# Patient Record
Sex: MALE | Race: WHITE | NOT HISPANIC OR LATINO | URBAN - METROPOLITAN AREA
[De-identification: names, ages, dates, MRNs, and addresses within clinical notes are randomized per-mention and may not be internally consistent; named-entity substitution may affect disease eponyms.]

---

## 2017-01-04 ENCOUNTER — OUTPATIENT (OUTPATIENT)
Dept: OUTPATIENT SERVICES | Facility: HOSPITAL | Age: 72
LOS: 1 days | Discharge: HOME | End: 2017-01-04

## 2017-01-04 ENCOUNTER — APPOINTMENT (OUTPATIENT)
Dept: INFUSION THERAPY | Facility: CLINIC | Age: 72
End: 2017-01-04

## 2017-01-04 LAB
BASOPHILS # BLD: 0.03 TH/MM3
BASOPHILS NFR BLD: 0.3 %
EOSINOPHIL # BLD: 0.41 TH/MM3
EOSINOPHIL NFR BLD: 3.7 %
ERYTHROCYTE [DISTWIDTH] IN BLOOD BY AUTOMATED COUNT: 12.6 %
GRANULOCYTES # BLD: 7.7 TH/MM3
GRANULOCYTES NFR BLD: 69.5 %
HCT VFR BLD AUTO: 41.4 %
HGB BLD-MCNC: 14.5 G/DL
IMM GRANULOCYTES # BLD: 0.08 TH/MM3
IMM GRANULOCYTES NFR BLD: 0.7 %
LYMPHOCYTES # BLD: 1.74 TH/MM3
LYMPHOCYTES NFR BLD: 15.7 %
MCH RBC QN AUTO: 31 PG
MCHC RBC AUTO-ENTMCNC: 35 G/DL
MCV RBC AUTO: 88.5 FL
MONOCYTES # BLD: 1.12 TH/MM3
MONOCYTES NFR BLD: 10.1 %
PLATELET # BLD: 197 TH/MM3
PMV BLD AUTO: 10.5 FL
RBC # BLD AUTO: 4.68 MIL/MM3
WBC # BLD: 11.08 TH/MM3

## 2017-02-14 ENCOUNTER — APPOINTMENT (OUTPATIENT)
Dept: HEMATOLOGY ONCOLOGY | Facility: CLINIC | Age: 72
End: 2017-02-14

## 2017-02-14 ENCOUNTER — RESULT REVIEW (OUTPATIENT)
Age: 72
End: 2017-02-14

## 2017-02-15 LAB
ALBUMIN SERPL-MCNC: 4.1 G/DL
ALBUMIN/GLOB SERPL: 1.32
ALP SERPL-CCNC: 98 IU/L
ALT SERPL-CCNC: 27 IU/L
ANION GAP SERPL CALC-SCNC: 11 MEQ/L
AST SERPL-CCNC: 28 IU/L
BILIRUB SERPL-MCNC: 1.1 MG/DL
BUN SERPL-MCNC: 18 MG/DL
BUN/CREAT SERPL: 18.8 %
CALCIUM SERPL-MCNC: 9.4 MG/DL
CHLORIDE SERPL-SCNC: 102 MEQ/L
CO2 SERPL-SCNC: 26 MEQ/L
CREAT SERPL-MCNC: 0.96 MG/DL
GFR SERPL CREATININE-BSD FRML MDRD: 77
GLUCOSE SERPL-MCNC: 112 MG/DL
HBV CORE AB SER-ACNC: NONREACTIVE
HBV SURFACE AB SER-ACNC: NONREACTIVE
HBV SURFACE AG SER-ACNC: NONREACTIVE
HCV AB S/CO SERPL IA: 0.17 S/CO
HCV AB SER QL: NONREACTIVE
IGG FLD-MCNC: 1500 MG/DL
POTASSIUM SERPL-SCNC: 4.7 MMOL/L
PROT SERPL-MCNC: 7.2 G/DL
SODIUM SERPL-SCNC: 139 MEQ/L

## 2017-03-06 ENCOUNTER — RESULT REVIEW (OUTPATIENT)
Age: 72
End: 2017-03-06

## 2017-03-06 ENCOUNTER — APPOINTMENT (OUTPATIENT)
Dept: INFUSION THERAPY | Facility: CLINIC | Age: 72
End: 2017-03-06

## 2017-03-06 LAB
BASOPHILS # BLD: 0.04 TH/MM3
BASOPHILS NFR BLD: 0.4 %
EOSINOPHIL # BLD: 0.48 TH/MM3
EOSINOPHIL NFR BLD: 5.4 %
ERYTHROCYTE [DISTWIDTH] IN BLOOD BY AUTOMATED COUNT: 12.5 %
GRANULOCYTES # BLD: 5.67 TH/MM3
GRANULOCYTES NFR BLD: 63.1 %
HCT VFR BLD AUTO: 41.6 %
HGB BLD-MCNC: 14.8 G/DL
IMM GRANULOCYTES # BLD: 0.05 TH/MM3
IMM GRANULOCYTES NFR BLD: 0.6 %
LYMPHOCYTES # BLD: 1.64 TH/MM3
LYMPHOCYTES NFR BLD: 18.3 %
MCH RBC QN AUTO: 31 PG
MCHC RBC AUTO-ENTMCNC: 35.6 G/DL
MCV RBC AUTO: 87.2 FL
MONOCYTES # BLD: 1.09 TH/MM3
MONOCYTES NFR BLD: 12.2 %
PLATELET # BLD: 175 TH/MM3
PMV BLD AUTO: 10.5 FL
RBC # BLD AUTO: 4.77 MIL/MM3
WBC # BLD: 8.97 TH/MM3

## 2017-03-07 ENCOUNTER — APPOINTMENT (OUTPATIENT)
Dept: INFUSION THERAPY | Facility: CLINIC | Age: 72
End: 2017-03-07

## 2017-03-07 LAB
ALBUMIN SERPL-MCNC: 3.9 G/DL
ALBUMIN/GLOB SERPL: 1.18
ALP SERPL-CCNC: 88 IU/L
ALT SERPL-CCNC: 23 IU/L
ANION GAP SERPL CALC-SCNC: 11 MEQ/L
AST SERPL-CCNC: 23 IU/L
BILIRUB SERPL-MCNC: 0.9 MG/DL
BUN SERPL-MCNC: 22 MG/DL
BUN/CREAT SERPL: 22.2 %
CALCIUM SERPL-MCNC: 9.4 MG/DL
CHLORIDE SERPL-SCNC: 102 MEQ/L
CO2 SERPL-SCNC: 23 MEQ/L
CREAT SERPL-MCNC: 0.99 MG/DL
GFR SERPL CREATININE-BSD FRML MDRD: 74
GLUCOSE SERPL-MCNC: 106 MG/DL
POTASSIUM SERPL-SCNC: 4.5 MMOL/L
PROT SERPL-MCNC: 7.2 G/DL
SODIUM SERPL-SCNC: 136 MEQ/L

## 2017-03-15 ENCOUNTER — APPOINTMENT (OUTPATIENT)
Dept: INFUSION THERAPY | Facility: CLINIC | Age: 72
End: 2017-03-15

## 2017-03-15 LAB
BASOPHILS # BLD: 0.15 TH/MM3
BASOPHILS NFR BLD: 0.6 %
EOSINOPHIL # BLD: 0.42 TH/MM3
EOSINOPHIL NFR BLD: 1.7 %
ERYTHROCYTE [DISTWIDTH] IN BLOOD BY AUTOMATED COUNT: 12.9 %
GRANULOCYTES # BLD: 20.58 TH/MM3
GRANULOCYTES NFR BLD: 81.1 %
HCT VFR BLD AUTO: 41.5 %
HGB BLD-MCNC: 14.8 G/DL
IMM GRANULOCYTES # BLD: 1.1 TH/MM3
IMM GRANULOCYTES NFR BLD: 4.3 %
LYMPHOCYTES # BLD: 0.57 TH/MM3
LYMPHOCYTES NFR BLD: 2.2 %
MCH RBC QN AUTO: 31.4 PG
MCHC RBC AUTO-ENTMCNC: 35.7 G/DL
MCV RBC AUTO: 88.1 FL
MONOCYTES # BLD: 2.55 TH/MM3
MONOCYTES NFR BLD: 10.1 %
PLATELET # BLD: 147 TH/MM3
PMV BLD AUTO: 10 FL
RBC # BLD AUTO: 4.71 MIL/MM3
WBC # BLD: 25.37 TH/MM3

## 2017-03-20 ENCOUNTER — APPOINTMENT (OUTPATIENT)
Dept: INFUSION THERAPY | Facility: CLINIC | Age: 72
End: 2017-03-20

## 2017-03-20 LAB
BASOPHILS # BLD: 0.22 TH/MM3
BASOPHILS NFR BLD: 1.3 %
EOSINOPHIL # BLD: 1.07 TH/MM3
EOSINOPHIL NFR BLD: 6.4 %
ERYTHROCYTE [DISTWIDTH] IN BLOOD BY AUTOMATED COUNT: 13.2 %
GRANULOCYTES # BLD: 13.03 TH/MM3
GRANULOCYTES NFR BLD: 78.1 %
HCT VFR BLD AUTO: 42.6 %
HGB BLD-MCNC: 15.1 G/DL
IMM GRANULOCYTES # BLD: 0.55 TH/MM3
IMM GRANULOCYTES NFR BLD: 3.3 %
LYMPHOCYTES # BLD: 0.89 TH/MM3
LYMPHOCYTES NFR BLD: 5.3 %
MCH RBC QN AUTO: 31.5 PG
MCHC RBC AUTO-ENTMCNC: 35.4 G/DL
MCV RBC AUTO: 88.9 FL
MONOCYTES # BLD: 0.93 TH/MM3
MONOCYTES NFR BLD: 5.6 %
PLATELET # BLD: 162 TH/MM3
PMV BLD AUTO: 10.6 FL
RBC # BLD AUTO: 4.79 MIL/MM3
WBC # BLD: 16.69 TH/MM3

## 2017-03-30 ENCOUNTER — APPOINTMENT (OUTPATIENT)
Dept: HEMATOLOGY ONCOLOGY | Facility: CLINIC | Age: 72
End: 2017-03-30

## 2017-03-30 VITALS
DIASTOLIC BLOOD PRESSURE: 63 MMHG | HEIGHT: 68 IN | WEIGHT: 200 LBS | RESPIRATION RATE: 12 BRPM | HEART RATE: 83 BPM | SYSTOLIC BLOOD PRESSURE: 122 MMHG | TEMPERATURE: 98.2 F | BODY MASS INDEX: 30.31 KG/M2

## 2017-03-30 LAB
BASOPHILS # BLD: 0.19 TH/MM3
BASOPHILS NFR BLD: 2.3 %
EOSINOPHIL # BLD: 1.05 TH/MM3
EOSINOPHIL NFR BLD: 12.7 %
ERYTHROCYTE [DISTWIDTH] IN BLOOD BY AUTOMATED COUNT: 12.7 %
GRANULOCYTES # BLD: 4.52 TH/MM3
GRANULOCYTES NFR BLD: 54.8 %
HCT VFR BLD AUTO: 38.3 %
HGB BLD-MCNC: 13.1 G/DL
IMM GRANULOCYTES # BLD: 0.07 TH/MM3
IMM GRANULOCYTES NFR BLD: 0.8 %
LYMPHOCYTES # BLD: 0.79 TH/MM3
LYMPHOCYTES NFR BLD: 9.6 %
MCH RBC QN AUTO: 30.5 PG
MCHC RBC AUTO-ENTMCNC: 34.2 G/DL
MCV RBC AUTO: 89.3 FL
MONOCYTES # BLD: 1.63 TH/MM3
MONOCYTES NFR BLD: 19.8 %
PLATELET # BLD: 215 TH/MM3
PMV BLD AUTO: 9.4 FL
RBC # BLD AUTO: 4.29 MIL/MM3
WBC # BLD: 8.25 TH/MM3

## 2017-03-31 LAB
ALBUMIN SERPL-MCNC: 3.5 G/DL
ALBUMIN/GLOB SERPL: 1.09
ALP SERPL-CCNC: 129 IU/L
ALT SERPL-CCNC: 33 IU/L
ANION GAP SERPL CALC-SCNC: 10 MEQ/L
AST SERPL-CCNC: 19 IU/L
BILIRUB SERPL-MCNC: 0.7 MG/DL
BUN SERPL-MCNC: 18 MG/DL
BUN/CREAT SERPL: 15.3 %
CALCIUM SERPL-MCNC: 9.5 MG/DL
CHLORIDE SERPL-SCNC: 104 MEQ/L
CO2 SERPL-SCNC: 28 MEQ/L
CREAT SERPL-MCNC: 1.18 MG/DL
GFR SERPL CREATININE-BSD FRML MDRD: 61
GLUCOSE SERPL-MCNC: 152 MG/DL
POTASSIUM SERPL-SCNC: 4.3 MMOL/L
PROT SERPL-MCNC: 6.7 G/DL
SODIUM SERPL-SCNC: 142 MEQ/L

## 2017-04-04 ENCOUNTER — APPOINTMENT (OUTPATIENT)
Dept: INFUSION THERAPY | Facility: CLINIC | Age: 72
End: 2017-04-04

## 2017-04-05 ENCOUNTER — APPOINTMENT (OUTPATIENT)
Dept: INFUSION THERAPY | Facility: CLINIC | Age: 72
End: 2017-04-05

## 2017-04-11 ENCOUNTER — APPOINTMENT (OUTPATIENT)
Dept: INFUSION THERAPY | Facility: CLINIC | Age: 72
End: 2017-04-11

## 2017-04-18 ENCOUNTER — APPOINTMENT (OUTPATIENT)
Dept: INFUSION THERAPY | Facility: CLINIC | Age: 72
End: 2017-04-18

## 2017-04-27 ENCOUNTER — APPOINTMENT (OUTPATIENT)
Dept: HEMATOLOGY ONCOLOGY | Facility: CLINIC | Age: 72
End: 2017-04-27

## 2017-04-27 VITALS
TEMPERATURE: 97.6 F | WEIGHT: 200 LBS | HEIGHT: 68 IN | HEART RATE: 83 BPM | DIASTOLIC BLOOD PRESSURE: 62 MMHG | RESPIRATION RATE: 14 BRPM | BODY MASS INDEX: 30.31 KG/M2 | SYSTOLIC BLOOD PRESSURE: 111 MMHG

## 2017-04-27 LAB
BASOPHILS # BLD: 0.19 TH/MM3
BASOPHILS NFR BLD: 1.6 %
EOSINOPHIL # BLD: 2.1 TH/MM3
EOSINOPHIL NFR BLD: 17.6 %
ERYTHROCYTE [DISTWIDTH] IN BLOOD BY AUTOMATED COUNT: 13.7 %
GRANULOCYTES # BLD: 5.07 TH/MM3
GRANULOCYTES NFR BLD: 42.4 %
HCT VFR BLD AUTO: 36.1 %
HGB BLD-MCNC: 12.3 G/DL
IMM GRANULOCYTES # BLD: 0.16 TH/MM3
IMM GRANULOCYTES NFR BLD: 1.3 %
LYMPHOCYTES # BLD: 2.2 TH/MM3
LYMPHOCYTES NFR BLD: 18.4 %
MCH RBC QN AUTO: 31.1 PG
MCHC RBC AUTO-ENTMCNC: 34.1 G/DL
MCV RBC AUTO: 91.2 FL
MONOCYTES # BLD: 2.24 TH/MM3
MONOCYTES NFR BLD: 18.7 %
PLATELET # BLD: 243 TH/MM3
PMV BLD AUTO: 9.9 FL
RBC # BLD AUTO: 3.96 MIL/MM3
WBC # BLD: 11.96 TH/MM3

## 2017-04-28 LAB
ALBUMIN SERPL-MCNC: 3.4 G/DL
ALBUMIN/GLOB SERPL: 1.31
ALP SERPL-CCNC: 135 IU/L
ALT SERPL-CCNC: 35 IU/L
ANION GAP SERPL CALC-SCNC: 11 MEQ/L
AST SERPL-CCNC: 29 IU/L
BILIRUB SERPL-MCNC: 0.8 MG/DL
BUN SERPL-MCNC: 13 MG/DL
BUN/CREAT SERPL: 13 %
CALCIUM SERPL-MCNC: 9.4 MG/DL
CHLORIDE SERPL-SCNC: 106 MEQ/L
CO2 SERPL-SCNC: 25 MEQ/L
CREAT SERPL-MCNC: 1 MG/DL
GFR SERPL CREATININE-BSD FRML MDRD: 73
GLUCOSE SERPL-MCNC: 111 MG/DL
POTASSIUM SERPL-SCNC: 4.9 MMOL/L
PROT SERPL-MCNC: 6 G/DL
SODIUM SERPL-SCNC: 142 MEQ/L

## 2017-05-02 ENCOUNTER — APPOINTMENT (OUTPATIENT)
Dept: INFUSION THERAPY | Facility: CLINIC | Age: 72
End: 2017-05-02

## 2017-05-03 ENCOUNTER — APPOINTMENT (OUTPATIENT)
Dept: INFUSION THERAPY | Facility: CLINIC | Age: 72
End: 2017-05-03

## 2017-05-22 ENCOUNTER — APPOINTMENT (OUTPATIENT)
Dept: HEMATOLOGY ONCOLOGY | Facility: CLINIC | Age: 72
End: 2017-05-22

## 2017-05-22 VITALS
DIASTOLIC BLOOD PRESSURE: 66 MMHG | HEIGHT: 68 IN | BODY MASS INDEX: 30.16 KG/M2 | RESPIRATION RATE: 12 BRPM | WEIGHT: 199 LBS | HEART RATE: 94 BPM | TEMPERATURE: 97.3 F | SYSTOLIC BLOOD PRESSURE: 121 MMHG

## 2017-05-22 LAB
BASOPHILS # BLD: 0.06 TH/MM3
BASOPHILS NFR BLD: 0.6 %
EOSINOPHIL # BLD: 1.47 TH/MM3
EOSINOPHIL NFR BLD: 14.6 %
ERYTHROCYTE [DISTWIDTH] IN BLOOD BY AUTOMATED COUNT: 14.1 %
GRANULOCYTES # BLD: 4.44 TH/MM3
GRANULOCYTES NFR BLD: 44.3 %
HCT VFR BLD AUTO: 34.1 %
HGB BLD-MCNC: 11.8 G/DL
IMM GRANULOCYTES # BLD: 0.06 TH/MM3
IMM GRANULOCYTES NFR BLD: 0.6 %
LYMPHOCYTES # BLD: 1.86 TH/MM3
LYMPHOCYTES NFR BLD: 18.5 %
MCH RBC QN AUTO: 31.6 PG
MCHC RBC AUTO-ENTMCNC: 34.6 G/DL
MCV RBC AUTO: 91.4 FL
MONOCYTES # BLD: 2.15 TH/MM3
MONOCYTES NFR BLD: 21.4 %
PLATELET # BLD: 188 TH/MM3
PMV BLD AUTO: 10.3 FL
RBC # BLD AUTO: 3.73 MIL/MM3
WBC # BLD: 10.04 TH/MM3

## 2017-05-30 ENCOUNTER — APPOINTMENT (OUTPATIENT)
Dept: INFUSION THERAPY | Facility: CLINIC | Age: 72
End: 2017-05-30

## 2017-05-30 LAB
BASOPHILS # BLD: 0.05 TH/MM3
BASOPHILS NFR BLD: 0.6 %
EOSINOPHIL # BLD: 1.01 TH/MM3
EOSINOPHIL NFR BLD: 12.8 %
ERYTHROCYTE [DISTWIDTH] IN BLOOD BY AUTOMATED COUNT: 13.9 %
GRANULOCYTES # BLD: 3.58 TH/MM3
GRANULOCYTES NFR BLD: 45.3 %
HCT VFR BLD AUTO: 32 %
HGB BLD-MCNC: 11.1 G/DL
IMM GRANULOCYTES # BLD: 0.06 TH/MM3
IMM GRANULOCYTES NFR BLD: 0.8 %
LYMPHOCYTES # BLD: 1.61 TH/MM3
LYMPHOCYTES NFR BLD: 20.4 %
MCH RBC QN AUTO: 31.6 PG
MCHC RBC AUTO-ENTMCNC: 34.7 G/DL
MCV RBC AUTO: 91.2 FL
MONOCYTES # BLD: 1.59 TH/MM3
MONOCYTES NFR BLD: 20.1 %
PLATELET # BLD: 174 TH/MM3
PMV BLD AUTO: 10.1 FL
RBC # BLD AUTO: 3.51 MIL/MM3
WBC # BLD: 7.9 TH/MM3

## 2017-05-31 ENCOUNTER — APPOINTMENT (OUTPATIENT)
Dept: INFUSION THERAPY | Facility: CLINIC | Age: 72
End: 2017-05-31

## 2017-06-19 ENCOUNTER — APPOINTMENT (OUTPATIENT)
Dept: HEMATOLOGY ONCOLOGY | Facility: CLINIC | Age: 72
End: 2017-06-19

## 2017-06-19 VITALS
SYSTOLIC BLOOD PRESSURE: 125 MMHG | BODY MASS INDEX: 27.28 KG/M2 | RESPIRATION RATE: 12 BRPM | TEMPERATURE: 97.3 F | HEART RATE: 77 BPM | DIASTOLIC BLOOD PRESSURE: 62 MMHG | WEIGHT: 180 LBS | HEIGHT: 68 IN

## 2017-06-19 LAB
BASOPHILS # BLD: 0.05 TH/MM3
BASOPHILS NFR BLD: 0.7 %
EOSINOPHIL # BLD: 0.7 TH/MM3
EOSINOPHIL NFR BLD: 9.7 %
ERYTHROCYTE [DISTWIDTH] IN BLOOD BY AUTOMATED COUNT: 13.7 %
GRANULOCYTES # BLD: 3.76 TH/MM3
GRANULOCYTES NFR BLD: 52.1 %
HCT VFR BLD AUTO: 31.7 %
HGB BLD-MCNC: 10.9 G/DL
IMM GRANULOCYTES # BLD: 0.03 TH/MM3
IMM GRANULOCYTES NFR BLD: 0.4 %
LYMPHOCYTES # BLD: 1.19 TH/MM3
LYMPHOCYTES NFR BLD: 16.5 %
MCH RBC QN AUTO: 31.8 PG
MCHC RBC AUTO-ENTMCNC: 34.4 G/DL
MCV RBC AUTO: 92.4 FL
MONOCYTES # BLD: 1.49 TH/MM3
MONOCYTES NFR BLD: 20.6 %
PLATELET # BLD: 185 TH/MM3
PMV BLD AUTO: 10.1 FL
RBC # BLD AUTO: 3.43 MIL/MM3
WBC # BLD: 7.22 TH/MM3

## 2017-06-20 LAB
ALBUMIN SERPL-MCNC: 4 G/DL
ALBUMIN/GLOB SERPL: 2
ALP SERPL-CCNC: 148 IU/L
ALT SERPL-CCNC: 25 IU/L
ANION GAP SERPL CALC-SCNC: 9 MEQ/L
AST SERPL-CCNC: 27 IU/L
BILIRUB SERPL-MCNC: 0.8 MG/DL
BUN SERPL-MCNC: 18 MG/DL
BUN/CREAT SERPL: 17.5 %
CALCIUM SERPL-MCNC: 9.3 MG/DL
CHLORIDE SERPL-SCNC: 108 MEQ/L
CO2 SERPL-SCNC: 22 MEQ/L
CREAT SERPL-MCNC: 1.03 MG/DL
GFR SERPL CREATININE-BSD FRML MDRD: 71
GLUCOSE SERPL-MCNC: 100 MG/DL
POTASSIUM SERPL-SCNC: 4.5 MMOL/L
PROT SERPL-MCNC: 6 G/DL
SODIUM SERPL-SCNC: 139 MEQ/L

## 2017-06-27 ENCOUNTER — APPOINTMENT (OUTPATIENT)
Dept: INFUSION THERAPY | Facility: CLINIC | Age: 72
End: 2017-06-27

## 2017-06-27 LAB
BASOPHILS # BLD: 0.04 TH/MM3
BASOPHILS NFR BLD: 0.6 %
EOSINOPHIL # BLD: 0.57 TH/MM3
EOSINOPHIL NFR BLD: 9.1 %
ERYTHROCYTE [DISTWIDTH] IN BLOOD BY AUTOMATED COUNT: 13.7 %
GRANULOCYTES # BLD: 2.78 TH/MM3
GRANULOCYTES NFR BLD: 44.4 %
HCT VFR BLD AUTO: 29.6 %
HGB BLD-MCNC: 10 G/DL
IMM GRANULOCYTES # BLD: 0.04 TH/MM3
IMM GRANULOCYTES NFR BLD: 0.6 %
LYMPHOCYTES # BLD: 1.16 TH/MM3
LYMPHOCYTES NFR BLD: 18.5 %
MCH RBC QN AUTO: 31.5 PG
MCHC RBC AUTO-ENTMCNC: 33.8 G/DL
MCV RBC AUTO: 93.4 FL
MONOCYTES # BLD: 1.68 TH/MM3
MONOCYTES NFR BLD: 26.8 %
PLATELET # BLD: 158 TH/MM3
PMV BLD AUTO: 9.9 FL
RBC # BLD AUTO: 3.17 MIL/MM3
WBC # BLD: 6.27 TH/MM3

## 2017-06-28 ENCOUNTER — APPOINTMENT (OUTPATIENT)
Dept: INFUSION THERAPY | Facility: CLINIC | Age: 72
End: 2017-06-28

## 2017-07-17 ENCOUNTER — APPOINTMENT (OUTPATIENT)
Dept: HEMATOLOGY ONCOLOGY | Facility: CLINIC | Age: 72
End: 2017-07-17

## 2017-07-17 VITALS
SYSTOLIC BLOOD PRESSURE: 123 MMHG | TEMPERATURE: 97.4 F | BODY MASS INDEX: 27.89 KG/M2 | HEIGHT: 68 IN | HEART RATE: 71 BPM | RESPIRATION RATE: 12 BRPM | DIASTOLIC BLOOD PRESSURE: 64 MMHG | WEIGHT: 184 LBS

## 2017-07-17 DIAGNOSIS — Z00.00 ENCOUNTER FOR GENERAL ADULT MEDICAL EXAMINATION W/OUT ABNORMAL FINDINGS: ICD-10-CM

## 2017-07-17 LAB
BASOPHILS # BLD: 0.03 TH/MM3
BASOPHILS NFR BLD: 0.5 %
EOSINOPHIL # BLD: 0.51 TH/MM3
EOSINOPHIL NFR BLD: 7.8 %
ERYTHROCYTE [DISTWIDTH] IN BLOOD BY AUTOMATED COUNT: 13 %
GRANULOCYTES # BLD: 3.54 TH/MM3
GRANULOCYTES NFR BLD: 54 %
HCT VFR BLD AUTO: 33.1 %
HGB BLD-MCNC: 11.3 G/DL
IMM GRANULOCYTES # BLD: 0.04 TH/MM3
IMM GRANULOCYTES NFR BLD: 0.6 %
LYMPHOCYTES # BLD: 0.99 TH/MM3
LYMPHOCYTES NFR BLD: 15.1 %
MCH RBC QN AUTO: 31.7 PG
MCHC RBC AUTO-ENTMCNC: 34.1 G/DL
MCV RBC AUTO: 93 FL
MONOCYTES # BLD: 1.44 TH/MM3
MONOCYTES NFR BLD: 22 %
PLATELET # BLD: 156 TH/MM3
PMV BLD AUTO: 9.6 FL
RBC # BLD AUTO: 3.56 MIL/MM3
WBC # BLD: 6.55 TH/MM3

## 2017-07-18 LAB
ALBUMIN SERPL-MCNC: 3.8 G/DL
ALBUMIN/GLOB SERPL: 2
ALP SERPL-CCNC: 136 IU/L
ALT SERPL-CCNC: 17 IU/L
ANION GAP SERPL CALC-SCNC: 6 MEQ/L
AST SERPL-CCNC: 29 IU/L
BILIRUB SERPL-MCNC: 0.8 MG/DL
BUN SERPL-MCNC: 14 MG/DL
BUN/CREAT SERPL: 13.5 %
CALCIUM SERPL-MCNC: 9.6 MG/DL
CHLORIDE SERPL-SCNC: 106 MEQ/L
CO2 SERPL-SCNC: 27 MEQ/L
CREAT SERPL-MCNC: 1.04 MG/DL
GFR SERPL CREATININE-BSD FRML MDRD: 70
GLUCOSE SERPL-MCNC: 97 MG/DL
POTASSIUM SERPL-SCNC: 5.2 MMOL/L
PROT SERPL-MCNC: 5.7 G/DL
SODIUM SERPL-SCNC: 139 MEQ/L

## 2017-07-25 ENCOUNTER — APPOINTMENT (OUTPATIENT)
Dept: INFUSION THERAPY | Facility: CLINIC | Age: 72
End: 2017-07-25

## 2017-07-26 ENCOUNTER — RX RENEWAL (OUTPATIENT)
Age: 72
End: 2017-07-26

## 2017-07-26 ENCOUNTER — OUTPATIENT (OUTPATIENT)
Dept: OUTPATIENT SERVICES | Facility: HOSPITAL | Age: 72
LOS: 1 days | Discharge: HOME | End: 2017-07-26

## 2017-07-26 ENCOUNTER — APPOINTMENT (OUTPATIENT)
Dept: INFUSION THERAPY | Facility: CLINIC | Age: 72
End: 2017-07-26

## 2017-07-26 DIAGNOSIS — R63.0 ANOREXIA: ICD-10-CM

## 2017-07-26 DIAGNOSIS — R63.4 ABNORMAL WEIGHT LOSS: ICD-10-CM

## 2017-07-26 DIAGNOSIS — C85.90 NON-HODGKIN LYMPHOMA, UNSPECIFIED, UNSPECIFIED SITE: ICD-10-CM

## 2017-07-26 DIAGNOSIS — Z51.11 ENCOUNTER FOR ANTINEOPLASTIC CHEMOTHERAPY: ICD-10-CM

## 2017-08-28 ENCOUNTER — APPOINTMENT (OUTPATIENT)
Dept: HEMATOLOGY ONCOLOGY | Facility: CLINIC | Age: 72
End: 2017-08-28

## 2017-08-28 VITALS
SYSTOLIC BLOOD PRESSURE: 109 MMHG | DIASTOLIC BLOOD PRESSURE: 63 MMHG | HEART RATE: 90 BPM | HEIGHT: 68 IN | TEMPERATURE: 98 F | BODY MASS INDEX: 27.74 KG/M2 | RESPIRATION RATE: 14 BRPM | WEIGHT: 183 LBS

## 2017-09-25 DIAGNOSIS — C85.90 NON-HODGKIN LYMPHOMA, UNSPECIFIED, UNSPECIFIED SITE: ICD-10-CM

## 2017-11-20 ENCOUNTER — APPOINTMENT (OUTPATIENT)
Dept: HEMATOLOGY ONCOLOGY | Facility: CLINIC | Age: 72
End: 2017-11-20

## 2017-11-20 ENCOUNTER — OUTPATIENT (OUTPATIENT)
Dept: OUTPATIENT SERVICES | Facility: HOSPITAL | Age: 72
LOS: 1 days | Discharge: HOME | End: 2017-11-20

## 2017-11-20 ENCOUNTER — RESULT REVIEW (OUTPATIENT)
Age: 72
End: 2017-11-20

## 2017-11-20 VITALS
SYSTOLIC BLOOD PRESSURE: 138 MMHG | TEMPERATURE: 96.9 F | HEART RATE: 74 BPM | BODY MASS INDEX: 28.28 KG/M2 | RESPIRATION RATE: 14 BRPM | DIASTOLIC BLOOD PRESSURE: 85 MMHG | WEIGHT: 186 LBS

## 2017-11-20 DIAGNOSIS — E78.2 MIXED HYPERLIPIDEMIA: ICD-10-CM

## 2017-11-20 DIAGNOSIS — C85.90 NON-HODGKIN LYMPHOMA, UNSPECIFIED, UNSPECIFIED SITE: ICD-10-CM

## 2017-11-20 DIAGNOSIS — D64.9 ANEMIA, UNSPECIFIED: ICD-10-CM

## 2017-11-20 DIAGNOSIS — I10 ESSENTIAL (PRIMARY) HYPERTENSION: ICD-10-CM

## 2017-11-20 LAB
BASOPHILS # BLD: 0.03 TH/MM3
BASOPHILS NFR BLD: 0.6 %
EOSINOPHIL # BLD: 0.43 TH/MM3
EOSINOPHIL NFR BLD: 8.1 %
ERYTHROCYTE [DISTWIDTH] IN BLOOD BY AUTOMATED COUNT: 12 %
GRANULOCYTES # BLD: 2.53 TH/MM3
GRANULOCYTES NFR BLD: 47.5 %
HCT VFR BLD AUTO: 37.7 %
HGB BLD-MCNC: 13.3 G/DL
IMM GRANULOCYTES # BLD: 0.01 TH/MM3
IMM GRANULOCYTES NFR BLD: 0.2 %
LYMPHOCYTES # BLD: 1.36 TH/MM3
LYMPHOCYTES NFR BLD: 25.6 %
MCH RBC QN AUTO: 31.4 PG
MCHC RBC AUTO-ENTMCNC: 35.3 G/DL
MCV RBC AUTO: 88.9 FL
MONOCYTES # BLD: 0.96 TH/MM3
MONOCYTES NFR BLD: 18 %
PLATELET # BLD: 152 TH/MM3
PMV BLD AUTO: 9.9 FL
RBC # BLD AUTO: 4.24 MIL/MM3
WBC # BLD: 5.32 TH/MM3

## 2017-11-21 LAB
ALBUMIN SERPL-MCNC: 4.5 G/DL
ALBUMIN/GLOB SERPL: 2.25
ALP SERPL-CCNC: 107 IU/L
ALT SERPL-CCNC: 19 IU/L
ANION GAP SERPL CALC-SCNC: 10 MEQ/L
APPEARANCE UR: CLEAR
AST SERPL-CCNC: 27 IU/L
BILIRUB SERPL-MCNC: 1.2 MG/DL
BILIRUB UR QL STRIP: NEGATIVE
BUN SERPL-MCNC: 26 MG/DL
BUN/CREAT SERPL: 21.3 %
CALCIUM SERPL-MCNC: 9.6 MG/DL
CHLORIDE SERPL-SCNC: 104 MEQ/L
CHOLEST SERPL-MCNC: 159 MG/DL
CO2 SERPL-SCNC: 25 MEQ/L
COLOR UR: YELLOW
CREAT SERPL-MCNC: 1.22 MG/DL
ESTIMATED AVERGAGE GLUCOSE (NORTH): 120 MG/DL
GFR SERPL CREATININE-BSD FRML MDRD: 58
GLUCOSE SERPL-MCNC: 96 MG/DL
GLUCOSE UR STRIP-MCNC: NEGATIVE MG/DL
HBA1C MFR BLD: 5.8 %
HDLC SERPL-MCNC: 40 MG/DL
HDLC SERPL: 3.98
HGB UR QL STRIP: NEGATIVE
KETONES UR STRIP-MCNC: NEGATIVE MG/DL
LACTATE DEHYDROGENASE (NORTH): 230 IU/L
LDLC SERPL DIRECT ASSAY-MCNC: 95 MG/DL
NITRITE UR QL STRIP: NEGATIVE
PH UR STRIP: 5.5
POTASSIUM SERPL-SCNC: 4.9 MMOL/L
PROT SERPL-MCNC: 6.5 G/DL
PROT UR STRIP-MCNC: NEGATIVE MG/DL
PSA FREE FLD-MCNC: 0.31 NG/ML
PSA FREE FLD-MCNC: 25.4
PSA FREE MFR FLD: 1.23 NG/ML
SODIUM SERPL-SCNC: 139 MEQ/L
SP GR UR STRIP: 1.02
T4 FREE SERPL-MCNC: 1.2 NG/DL
TRIGL SERPL-MCNC: 113 MG/DL
TSH SERPL DL<=0.005 MIU/L-ACNC: 0.98 UIU/ML
UROBILINOGEN UR STRIP-MCNC: 0.2 MG/DL
VLDLC SERPL-MCNC: 22 MG/DL
WBC URNS QL MICRO: NEGATIVE

## 2017-11-22 LAB — BACTERIA UR CULT: NORMAL

## 2017-11-30 DIAGNOSIS — Z02.9 ENCOUNTER FOR ADMINISTRATIVE EXAMINATIONS, UNSPECIFIED: ICD-10-CM

## 2018-01-25 ENCOUNTER — RX RENEWAL (OUTPATIENT)
Age: 73
End: 2018-01-25

## 2018-04-12 ENCOUNTER — OUTPATIENT (OUTPATIENT)
Dept: OUTPATIENT SERVICES | Facility: HOSPITAL | Age: 73
LOS: 1 days | Discharge: HOME | End: 2018-04-12

## 2018-04-12 ENCOUNTER — APPOINTMENT (OUTPATIENT)
Dept: HEMATOLOGY ONCOLOGY | Facility: CLINIC | Age: 73
End: 2018-04-12

## 2018-04-12 ENCOUNTER — LABORATORY RESULT (OUTPATIENT)
Age: 73
End: 2018-04-12

## 2018-04-12 VITALS
TEMPERATURE: 96.9 F | HEART RATE: 91 BPM | RESPIRATION RATE: 14 BRPM | BODY MASS INDEX: 31.07 KG/M2 | WEIGHT: 205 LBS | HEIGHT: 68 IN | DIASTOLIC BLOOD PRESSURE: 67 MMHG | SYSTOLIC BLOOD PRESSURE: 147 MMHG

## 2018-04-12 LAB
HCT VFR BLD CALC: 40.6 %
HGB BLD-MCNC: 13.8 G/DL
MCHC RBC-ENTMCNC: 31.3 PG
MCHC RBC-ENTMCNC: 34 G/DL
MCV RBC AUTO: 92.1 FL
PLATELET # BLD AUTO: 178 K/UL
PMV BLD: 9.7 FL
RBC # BLD: 4.41 M/UL
RBC # FLD: 12.7 %
WBC # FLD AUTO: 9.51 K/UL

## 2018-04-13 LAB
ALBUMIN SERPL ELPH-MCNC: 4.5 G/DL
ALP BLD-CCNC: 107 U/L
ALT SERPL-CCNC: 21 U/L
ANION GAP SERPL CALC-SCNC: 16 MMOL/L
AST SERPL-CCNC: 26 U/L
BILIRUB SERPL-MCNC: 0.5 MG/DL
BUN SERPL-MCNC: 22 MG/DL
CALCIUM SERPL-MCNC: 9.7 MG/DL
CHLORIDE SERPL-SCNC: 103 MMOL/L
CO2 SERPL-SCNC: 23 MMOL/L
CREAT SERPL-MCNC: 1.3 MG/DL
GLUCOSE SERPL-MCNC: 122 MG/DL
IGG SER QL IEP: 977 MG/DL
LDH SERPL-CCNC: 334 U/L
POTASSIUM SERPL-SCNC: 5 MMOL/L
PROT SERPL-MCNC: 7.2 G/DL
SODIUM SERPL-SCNC: 142 MMOL/L

## 2018-04-16 DIAGNOSIS — C85.90 NON-HODGKIN LYMPHOMA, UNSPECIFIED, UNSPECIFIED SITE: ICD-10-CM

## 2018-10-11 ENCOUNTER — OUTPATIENT (OUTPATIENT)
Dept: OUTPATIENT SERVICES | Facility: HOSPITAL | Age: 73
LOS: 1 days | Discharge: HOME | End: 2018-10-11

## 2018-10-11 ENCOUNTER — LABORATORY RESULT (OUTPATIENT)
Age: 73
End: 2018-10-11

## 2018-10-11 ENCOUNTER — APPOINTMENT (OUTPATIENT)
Dept: HEMATOLOGY ONCOLOGY | Facility: CLINIC | Age: 73
End: 2018-10-11

## 2018-10-11 VITALS
SYSTOLIC BLOOD PRESSURE: 142 MMHG | HEIGHT: 68 IN | RESPIRATION RATE: 14 BRPM | BODY MASS INDEX: 30.77 KG/M2 | TEMPERATURE: 97.3 F | HEART RATE: 62 BPM | DIASTOLIC BLOOD PRESSURE: 80 MMHG | WEIGHT: 203 LBS

## 2018-10-11 LAB
ALBUMIN SERPL ELPH-MCNC: 4.6 G/DL
ALP BLD-CCNC: 119 U/L
ALT SERPL-CCNC: 17 U/L
ANION GAP SERPL CALC-SCNC: 15 MMOL/L
AST SERPL-CCNC: 21 U/L
BILIRUB SERPL-MCNC: 0.6 MG/DL
BUN SERPL-MCNC: 14 MG/DL
CALCIUM SERPL-MCNC: 9.8 MG/DL
CHLORIDE SERPL-SCNC: 103 MMOL/L
CO2 SERPL-SCNC: 25 MMOL/L
CREAT SERPL-MCNC: 1.3 MG/DL
GLUCOSE SERPL-MCNC: 126 MG/DL
HCT VFR BLD CALC: 37.6 %
HGB BLD-MCNC: 13.1 G/DL
LDH SERPL-CCNC: 269 U/L
MCHC RBC-ENTMCNC: 32 PG
MCHC RBC-ENTMCNC: 34.8 G/DL
MCV RBC AUTO: 91.9 FL
PLATELET # BLD AUTO: 166 K/UL
PMV BLD: 10.8 FL
POTASSIUM SERPL-SCNC: 5 MMOL/L
PROT SERPL-MCNC: 6.7 G/DL
RBC # BLD: 4.09 M/UL
RBC # FLD: 12.5 %
SODIUM SERPL-SCNC: 143 MMOL/L
WBC # FLD AUTO: 5.96 K/UL

## 2018-10-15 DIAGNOSIS — C85.99 NON-HODGKIN LYMPHOMA, UNSPECIFIED, EXTRANODAL AND SOLID ORGAN SITES: ICD-10-CM

## 2019-04-22 ENCOUNTER — LABORATORY RESULT (OUTPATIENT)
Age: 74
End: 2019-04-22

## 2019-04-22 ENCOUNTER — OUTPATIENT (OUTPATIENT)
Dept: OUTPATIENT SERVICES | Facility: HOSPITAL | Age: 74
LOS: 1 days | Discharge: HOME | End: 2019-04-22

## 2019-04-22 ENCOUNTER — APPOINTMENT (OUTPATIENT)
Dept: HEMATOLOGY ONCOLOGY | Facility: CLINIC | Age: 74
End: 2019-04-22

## 2019-04-22 VITALS
RESPIRATION RATE: 14 BRPM | HEART RATE: 77 BPM | DIASTOLIC BLOOD PRESSURE: 85 MMHG | WEIGHT: 203 LBS | SYSTOLIC BLOOD PRESSURE: 166 MMHG | TEMPERATURE: 98.2 F | BODY MASS INDEX: 30.77 KG/M2 | HEIGHT: 68 IN

## 2019-04-22 LAB
HCT VFR BLD CALC: 38.4 %
HGB BLD-MCNC: 13.1 G/DL
MCHC RBC-ENTMCNC: 31.3 PG
MCHC RBC-ENTMCNC: 34.1 G/DL
MCV RBC AUTO: 91.9 FL
PLATELET # BLD AUTO: 163 K/UL
PMV BLD: 10.4 FL
RBC # BLD: 4.18 M/UL
RBC # FLD: 12.6 %
WBC # FLD AUTO: 6.41 K/UL

## 2019-04-23 DIAGNOSIS — C85.90 NON-HODGKIN LYMPHOMA, UNSPECIFIED, UNSPECIFIED SITE: ICD-10-CM

## 2019-04-23 LAB
ALBUMIN SERPL ELPH-MCNC: 4.4 G/DL
ALP BLD-CCNC: 117 U/L
ALT SERPL-CCNC: 39 U/L
ANION GAP SERPL CALC-SCNC: 13 MMOL/L
AST SERPL-CCNC: 41 U/L
BILIRUB SERPL-MCNC: 0.6 MG/DL
BUN SERPL-MCNC: 25 MG/DL
CALCIUM SERPL-MCNC: 9.6 MG/DL
CHLORIDE SERPL-SCNC: 105 MMOL/L
CO2 SERPL-SCNC: 23 MMOL/L
CREAT SERPL-MCNC: 1.2 MG/DL
GLUCOSE SERPL-MCNC: 132 MG/DL
LDH SERPL-CCNC: 216 U/L
POTASSIUM SERPL-SCNC: 5.7 MMOL/L
PROT SERPL-MCNC: 6.6 G/DL
SODIUM SERPL-SCNC: 141 MMOL/L

## 2019-04-23 NOTE — PHYSICAL EXAM
[Restricted in physically strenuous activity but ambulatory and able to carry out work of a light or sedentary nature] : Status 1- Restricted in physically strenuous activity but ambulatory and able to carry out work of a light or sedentary nature, e.g., light house work, office work [Normal] : normal appearance, no rash, nodules, vesicles, ulcers, erythema [de-identified] : Arthritic changes [de-identified] : Gait looks normal

## 2019-04-23 NOTE — HISTORY OF PRESENT ILLNESS
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 He had residual disease in his knees after last treated with obinutuzumab and bendamustine to which he had a very good partial remission.\par His major complaint right now is mostly left knee discomfort after much reduced physical activity compared to the past. He has to stop from walking frequently. The right knee doesn't seem to be causing too much problem for the time being.\par Otherwise, he is denying any fever, night sweats, anorexia, weight loss. No particular problems with urine or bowel movements. [de-identified] : Germinal center DLBCL vs follicular

## 2019-04-23 NOTE — ASSESSMENT
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 The behavior of the disease is more consistent with a low grade lymphoma. R/O relapse/progression.\par The situation was discussed with the patient and his wife. Given the new signs and symptoms, the patient should have a reevaluation of the disease status, preferably with an MRI of the left knee to have a better visualization of that area rather than getting a PET scan again.\par We will also draw a CBC, CMP, LDH.\par Further recommendations after the above test results are available.\par \par All questions answered.\par \par If all stable, the patient will be seen again in 4 - 6 months for follow up or sooner if the suspicion of progression is confirmed.

## 2019-10-21 ENCOUNTER — APPOINTMENT (OUTPATIENT)
Dept: HEMATOLOGY ONCOLOGY | Facility: CLINIC | Age: 74
End: 2019-10-21
Payer: MEDICARE

## 2019-10-21 ENCOUNTER — OUTPATIENT (OUTPATIENT)
Dept: OUTPATIENT SERVICES | Facility: HOSPITAL | Age: 74
LOS: 1 days | Discharge: HOME | End: 2019-10-21

## 2019-10-21 ENCOUNTER — LABORATORY RESULT (OUTPATIENT)
Age: 74
End: 2019-10-21

## 2019-10-21 VITALS
BODY MASS INDEX: 30.31 KG/M2 | RESPIRATION RATE: 14 BRPM | WEIGHT: 200 LBS | TEMPERATURE: 96.4 F | SYSTOLIC BLOOD PRESSURE: 146 MMHG | HEIGHT: 68 IN | DIASTOLIC BLOOD PRESSURE: 70 MMHG | HEART RATE: 70 BPM

## 2019-10-21 LAB
ALBUMIN SERPL ELPH-MCNC: 4.2 G/DL
ALP BLD-CCNC: 95 U/L
ALT SERPL-CCNC: 23 U/L
ANION GAP SERPL CALC-SCNC: 11 MMOL/L
AST SERPL-CCNC: 26 U/L
BILIRUB SERPL-MCNC: 0.4 MG/DL
BUN SERPL-MCNC: 25 MG/DL
CALCIUM SERPL-MCNC: 9.5 MG/DL
CHLORIDE SERPL-SCNC: 100 MMOL/L
CO2 SERPL-SCNC: 29 MMOL/L
CREAT SERPL-MCNC: 1.3 MG/DL
GLUCOSE SERPL-MCNC: 137 MG/DL
HCT VFR BLD CALC: 40.9 %
HGB BLD-MCNC: 13.8 G/DL
LDH SERPL-CCNC: 215 U/L
MCHC RBC-ENTMCNC: 31.4 PG
MCHC RBC-ENTMCNC: 33.7 G/DL
MCV RBC AUTO: 93 FL
PLATELET # BLD AUTO: 163 K/UL
PMV BLD: 9.9 FL
POTASSIUM SERPL-SCNC: 5.2 MMOL/L
PROT SERPL-MCNC: 6.8 G/DL
RBC # BLD: 4.4 M/UL
RBC # FLD: 12.8 %
SODIUM SERPL-SCNC: 140 MMOL/L
WBC # FLD AUTO: 9.02 K/UL

## 2019-10-21 PROCEDURE — 99214 OFFICE O/P EST MOD 30 MIN: CPT

## 2019-10-22 DIAGNOSIS — C85.90 NON-HODGKIN LYMPHOMA, UNSPECIFIED, UNSPECIFIED SITE: ICD-10-CM

## 2019-10-22 NOTE — REVIEW OF SYSTEMS
[Loss of Hearing] : loss of hearing [Joint Pain] : joint pain [Joint Stiffness] : joint stiffness [Negative] : Heme/Lymph

## 2019-11-01 ENCOUNTER — RX RENEWAL (OUTPATIENT)
Age: 74
End: 2019-11-01

## 2020-04-27 ENCOUNTER — APPOINTMENT (OUTPATIENT)
Dept: HEMATOLOGY ONCOLOGY | Facility: CLINIC | Age: 75
End: 2020-04-27

## 2020-05-28 ENCOUNTER — APPOINTMENT (OUTPATIENT)
Dept: HEMATOLOGY ONCOLOGY | Facility: CLINIC | Age: 75
End: 2020-05-28
Payer: MEDICARE

## 2020-05-28 PROCEDURE — 99213 OFFICE O/P EST LOW 20 MIN: CPT | Mod: 95

## 2020-05-28 NOTE — HISTORY OF PRESENT ILLNESS
[Medical Office: ([facility])___] : at the medical office located in  [Home] : at home, [unfilled] , at the time of the visit. [Disease:__________________________] : Disease: [unfilled] [Spouse] : spouse [Verbal consent obtained from patient] : the patient, [unfilled] [de-identified] : Telehealth consultation was performed calling the patient at his home upon his request. 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 He also had history of colon cancer years ago.\par The appetite is good. He has no fever or night sweats. No problems with urine or bowel movements. No new cough or shortness of breath. He has not felt any lumps or bumps in the neck, axillae or groins.\par He is getting ready to have back surgery again.\par The patient had several questions which were answered to his satisfaction.\par At this time he is due for reevaluation of his disease status with another PET scan in addition to blood work including a CBC, CMP, LDH, IgG, CEA. Will also add INR and PTT since he will undergo surgery.\par Further recommendations after the above test results are available. [de-identified] : Most likely follicular. [de-identified] : Low grade lymphoma

## 2020-05-28 NOTE — HISTORY OF PRESENT ILLNESS
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 His major problem seems to be his low back discomfort for which he is followed by his other physicians.\par His MRI about 6 months ago did not show any significant progression of the lymphoma in the knees.\par He is denying any fever or night sweats. He is walking with no major problems although getting tired sooner than in the past. The appetite is stable. No significant problems with urine or bowel movements.\par He seems to be up to date in terms of his screenings (especially colonoscopy). [de-identified] : Recurrent [de-identified] : Follicular, most likely, according to the pathologist.

## 2020-05-28 NOTE — PHYSICAL EXAM
[Restricted in physically strenuous activity but ambulatory and able to carry out work of a light or sedentary nature] : Status 1- Restricted in physically strenuous activity but ambulatory and able to carry out work of a light or sedentary nature, e.g., light house work, office work [Normal] : affect appropriate [de-identified] : Some arthritic changes

## 2020-05-28 NOTE — REVIEW OF SYSTEMS
[Fatigue] : fatigue [Loss of Hearing] : loss of hearing [Joint Pain] : joint pain [Joint Stiffness] : joint stiffness [Negative] : Endocrine

## 2020-05-28 NOTE — ASSESSMENT
[FreeTextEntry1] : 1. 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 R/O progressive disease.\par 2. Low back pain. Being addressed by his other physicians. Apparently secondary to arthritis and degererative changes.\par \par The situation was discussed with the patient and the wife.\par At this time, we will proceed with further blood work including a CBC, CMP, LDH.\par He is also due for another PET CT (rather than an MRI of the knees this time) for reevaluation of his disease status.\par All questions answered.\par Further recommendations after those results are available.\par If all stable, he will be seen again in 6 months for follow up.

## 2021-04-22 ENCOUNTER — LABORATORY RESULT (OUTPATIENT)
Age: 76
End: 2021-04-22

## 2021-04-22 ENCOUNTER — OUTPATIENT (OUTPATIENT)
Dept: OUTPATIENT SERVICES | Facility: HOSPITAL | Age: 76
LOS: 1 days | Discharge: HOME | End: 2021-04-22

## 2021-04-22 ENCOUNTER — APPOINTMENT (OUTPATIENT)
Dept: HEMATOLOGY ONCOLOGY | Facility: CLINIC | Age: 76
End: 2021-04-22
Payer: MEDICARE

## 2021-04-22 VITALS
HEIGHT: 68 IN | RESPIRATION RATE: 14 BRPM | TEMPERATURE: 97.6 F | SYSTOLIC BLOOD PRESSURE: 137 MMHG | BODY MASS INDEX: 29.55 KG/M2 | WEIGHT: 195 LBS | HEART RATE: 65 BPM | DIASTOLIC BLOOD PRESSURE: 80 MMHG

## 2021-04-22 DIAGNOSIS — M19.90 UNSPECIFIED OSTEOARTHRITIS, UNSPECIFIED SITE: ICD-10-CM

## 2021-04-22 DIAGNOSIS — H91.90 UNSPECIFIED HEARING LOSS, UNSPECIFIED EAR: ICD-10-CM

## 2021-04-22 LAB
HCT VFR BLD CALC: 42.2 %
HGB BLD-MCNC: 14.1 G/DL
MCHC RBC-ENTMCNC: 30.9 PG
MCHC RBC-ENTMCNC: 33.4 G/DL
MCV RBC AUTO: 92.3 FL
PLATELET # BLD AUTO: 197 K/UL
PMV BLD: 10.1 FL
RBC # BLD: 4.57 M/UL
RBC # FLD: 12.1 %
WBC # FLD AUTO: 7.24 K/UL

## 2021-04-22 PROCEDURE — 99214 OFFICE O/P EST MOD 30 MIN: CPT

## 2021-04-22 RX ORDER — OMEPRAZOLE 20 MG/1
20 CAPSULE, DELAYED RELEASE ORAL
Refills: 0 | Status: ACTIVE | COMMUNITY

## 2021-04-22 RX ORDER — ROSUVASTATIN CALCIUM 10 MG/1
10 TABLET, FILM COATED ORAL
Refills: 0 | Status: ACTIVE | COMMUNITY

## 2021-04-22 RX ORDER — ASPIRIN 81 MG
81 TABLET, DELAYED RELEASE (ENTERIC COATED) ORAL
Refills: 0 | Status: ACTIVE | COMMUNITY

## 2021-04-22 RX ORDER — ONDANSETRON 8 MG/1
8 TABLET ORAL EVERY 8 HOURS
Qty: 30 | Refills: 3 | Status: DISCONTINUED | COMMUNITY
Start: 2017-03-06 | End: 2021-04-22

## 2021-04-22 RX ORDER — PROCHLORPERAZINE MALEATE 10 MG/1
10 TABLET ORAL EVERY 6 HOURS
Qty: 30 | Refills: 3 | Status: DISCONTINUED | COMMUNITY
Start: 2017-03-06 | End: 2021-04-22

## 2021-04-22 RX ORDER — BENZOCAINE 200 MG/G
20 LIQUID DENTAL; ORAL; PERIODONTAL
Qty: 300 | Refills: 0 | Status: DISCONTINUED | COMMUNITY
Start: 2017-03-20 | End: 2021-04-22

## 2021-04-22 RX ORDER — SULFAMETHOXAZOLE AND TRIMETHOPRIM 800; 160 MG/1; MG/1
800-160 TABLET ORAL DAILY
Qty: 60 | Refills: 0 | Status: DISCONTINUED | COMMUNITY
Start: 2017-07-17 | End: 2021-04-22

## 2021-04-22 RX ORDER — DULOXETINE HYDROCHLORIDE 30 MG/1
CAPSULE, DELAYED RELEASE ORAL
Refills: 0 | Status: ACTIVE | COMMUNITY

## 2021-04-22 RX ORDER — METOPROLOL TARTRATE 25 MG/1
25 TABLET, FILM COATED ORAL
Refills: 0 | Status: ACTIVE | COMMUNITY

## 2021-04-22 NOTE — PHYSICAL EXAM
[Restricted in physically strenuous activity but ambulatory and able to carry out work of a light or sedentary nature] : Status 1- Restricted in physically strenuous activity but ambulatory and able to carry out work of a light or sedentary nature, e.g., light house work, office work [Normal] : affect appropriate [de-identified] : Midline scar [de-identified] : Arthritic changes noted.

## 2021-04-22 NOTE — HISTORY OF PRESENT ILLNESS
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 He also had history of colon cancer years ago.\par Since his last visit, the patient had a thoracolumbar fusion. He lost some weight, then gained some again. \par The appetite is good. He has no fever or night sweats. No problems with urine or bowel movements. No new cough or shortness of breath. He has not felt any lumps or bumps in the neck, axillae or groins.\par His main complaint today is arthritic pain, for which he is taking Tylenol . His meloxicam was stopped due to cardiac history. \par He has a scheduled colonoscopy in a few weeks.  [de-identified] : Low grade lymphoma [de-identified] : Most likely follicular.

## 2021-04-22 NOTE — ASSESSMENT
[FreeTextEntry1] : 1. 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 Last PET scan from 7/2020 showed with no evidence of disease. \par 2. Low back pain s/p thoracolumbar fusion.\par 3. History of colon cancer in 1988.\par \par The situation was discussed with the patient and the wife.\par At this time, we will proceed with further blood work including a CBC, CMP, LDH, ESR, IgG, CEA.\par He is also due for another PET CT but we will order  MRI of the knees this time for reevaluation of his disease status.\par All questions answered.\par Further recommendations after those results are available.\par If all stable, he will be seen again in 6 months for follow up. \par \par The patient was seen and examined by Dr Mayes who agreed with the above plan.\par

## 2021-04-23 LAB
ALBUMIN SERPL ELPH-MCNC: 4.6 G/DL
ALP BLD-CCNC: 129 U/L
ALT SERPL-CCNC: 18 U/L
ANION GAP SERPL CALC-SCNC: 14 MMOL/L
AST SERPL-CCNC: 27 U/L
BILIRUB SERPL-MCNC: 0.7 MG/DL
BUN SERPL-MCNC: 29 MG/DL
CALCIUM SERPL-MCNC: 9.7 MG/DL
CEA SERPL-MCNC: 3.3 NG/ML
CHLORIDE SERPL-SCNC: 103 MMOL/L
CO2 SERPL-SCNC: 21 MMOL/L
CREAT SERPL-MCNC: 1.5 MG/DL
ERYTHROCYTE [SEDIMENTATION RATE] IN BLOOD BY WESTERGREN METHOD: 34 MM/HR
GLUCOSE SERPL-MCNC: 130 MG/DL
LDH SERPL-CCNC: 258 U/L
POTASSIUM SERPL-SCNC: 5.8 MMOL/L
PROT SERPL-MCNC: 7.6 G/DL
SODIUM SERPL-SCNC: 138 MMOL/L

## 2021-04-26 LAB — IGG SER QL IEP: 1266 MG/DL

## 2021-05-07 DIAGNOSIS — C85.90 NON-HODGKIN LYMPHOMA, UNSPECIFIED, UNSPECIFIED SITE: ICD-10-CM

## 2021-10-28 ENCOUNTER — APPOINTMENT (OUTPATIENT)
Dept: HEMATOLOGY ONCOLOGY | Facility: CLINIC | Age: 76
End: 2021-10-28
Payer: MEDICARE

## 2021-10-28 ENCOUNTER — OUTPATIENT (OUTPATIENT)
Dept: OUTPATIENT SERVICES | Facility: HOSPITAL | Age: 76
LOS: 1 days | Discharge: HOME | End: 2021-10-28

## 2021-10-28 ENCOUNTER — LABORATORY RESULT (OUTPATIENT)
Age: 76
End: 2021-10-28

## 2021-10-28 VITALS
RESPIRATION RATE: 14 BRPM | HEART RATE: 62 BPM | DIASTOLIC BLOOD PRESSURE: 55 MMHG | TEMPERATURE: 97.6 F | SYSTOLIC BLOOD PRESSURE: 115 MMHG | HEIGHT: 68 IN | BODY MASS INDEX: 28.79 KG/M2 | WEIGHT: 190 LBS

## 2021-10-28 DIAGNOSIS — C85.90 NON-HODGKIN LYMPHOMA, UNSPECIFIED, UNSPECIFIED SITE: ICD-10-CM

## 2021-10-28 LAB
HCT VFR BLD CALC: 38.9 %
HGB BLD-MCNC: 13.1 G/DL
MCHC RBC-ENTMCNC: 32.2 PG
MCHC RBC-ENTMCNC: 33.7 G/DL
MCV RBC AUTO: 95.6 FL
PLATELET # BLD AUTO: 186 K/UL
PMV BLD: 10.3 FL
RBC # BLD: 4.07 M/UL
RBC # FLD: 12.4 %
WBC # FLD AUTO: 6.11 K/UL

## 2021-10-28 PROCEDURE — 99214 OFFICE O/P EST MOD 30 MIN: CPT

## 2021-10-28 NOTE — REVIEW OF SYSTEMS
[Fatigue] : fatigue [Loss of Hearing] : loss of hearing [Joint Pain] : joint pain [Joint Stiffness] : joint stiffness [Negative] : Psychiatric

## 2021-10-28 NOTE — PHYSICAL EXAM
[Restricted in physically strenuous activity but ambulatory and able to carry out work of a light or sedentary nature] : Status 1- Restricted in physically strenuous activity but ambulatory and able to carry out work of a light or sedentary nature, e.g., light house work, office work [Normal] : affect appropriate [de-identified] : Midline scar [de-identified] : Arthritic changes noted.

## 2021-10-28 NOTE — HISTORY OF PRESENT ILLNESS
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  He was treated with 6 cycles of obinutuzumab and bendamustine which he completed July 2017. \par He also had history of colon cancer in 1988\par \par He is doing well with no complaints of fever, chills, night sweats, TRACY. No new lumps or bumps. He lost 5 lbs since last visit- intentionally. \par MRI knee in April 2021 showed degenerative changes. No suspicious lesions. \par He is scheduled for colonoscopy next week.\par He was noted to have hyperkalemia. He is on enalapril and his dose was reduced recently [de-identified] : Low grade lymphoma [de-identified] : Most likely follicular.

## 2021-10-28 NOTE — ASSESSMENT
[FreeTextEntry1] : 1. 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  He was treated with 6 cycles of obinutuzumab and bendamustine which he completed July 2017. \par MRI knee in April 2021 showed degenerative changes. No suspicious lesions. \par 2. Low back pain s/p thoracolumbar fusion.\par 3. History of colon cancer in 1988.\par \par No B symptoms.\par \par The situation was discussed with the patient and the wife.\par At this time, we will proceed with further blood work including a CBC, CMP, LDH, ESR, IgG, CEA.\par All questions answered.\par Further recommendations,as needed, after those results are available.\par If all stable, he will be seen again in 6 months for follow up. \par \par The patient was seen and examined by Dr Mayes who agreed with the above plan.\par

## 2021-10-29 DIAGNOSIS — C85.90 NON-HODGKIN LYMPHOMA, UNSPECIFIED, UNSPECIFIED SITE: ICD-10-CM

## 2021-10-29 DIAGNOSIS — Z85.038 PERSONAL HISTORY OF OTHER MALIGNANT NEOPLASM OF LARGE INTESTINE: ICD-10-CM

## 2021-10-29 LAB
ALBUMIN SERPL ELPH-MCNC: 4.3 G/DL
ALP BLD-CCNC: 112 U/L
ALT SERPL-CCNC: 11 U/L
ANION GAP SERPL CALC-SCNC: 14 MMOL/L
AST SERPL-CCNC: 17 U/L
BILIRUB SERPL-MCNC: 0.3 MG/DL
BUN SERPL-MCNC: 23 MG/DL
CALCIUM SERPL-MCNC: 9.2 MG/DL
CEA SERPL-MCNC: 3.3 NG/ML
CHLORIDE SERPL-SCNC: 105 MMOL/L
CO2 SERPL-SCNC: 25 MMOL/L
CREAT SERPL-MCNC: 1.3 MG/DL
GLUCOSE SERPL-MCNC: 111 MG/DL
IGG SER QL IEP: 1041 MG/DL
LDH SERPL-CCNC: 231 U/L
POTASSIUM SERPL-SCNC: 5.3 MMOL/L
PROT SERPL-MCNC: 7 G/DL
SODIUM SERPL-SCNC: 144 MMOL/L

## 2022-05-19 ENCOUNTER — LABORATORY RESULT (OUTPATIENT)
Age: 77
End: 2022-05-19

## 2022-05-19 ENCOUNTER — OUTPATIENT (OUTPATIENT)
Dept: OUTPATIENT SERVICES | Facility: HOSPITAL | Age: 77
LOS: 1 days | Discharge: HOME | End: 2022-05-19

## 2022-05-19 ENCOUNTER — APPOINTMENT (OUTPATIENT)
Dept: HEMATOLOGY ONCOLOGY | Facility: CLINIC | Age: 77
End: 2022-05-19
Payer: MEDICARE

## 2022-05-19 VITALS
SYSTOLIC BLOOD PRESSURE: 149 MMHG | TEMPERATURE: 97.4 F | HEIGHT: 68 IN | HEART RATE: 81 BPM | DIASTOLIC BLOOD PRESSURE: 69 MMHG | RESPIRATION RATE: 14 BRPM | WEIGHT: 187 LBS | BODY MASS INDEX: 28.34 KG/M2

## 2022-05-19 DIAGNOSIS — Z85.79 PERSONAL HISTORY OF OTHER MALIGNANT NEOPLASMS OF LYMPHOID, HEMATOPOIETIC AND RELATED TISSUES: ICD-10-CM

## 2022-05-19 LAB
ALBUMIN SERPL ELPH-MCNC: 4.7 G/DL
ALP BLD-CCNC: 106 U/L
ALT SERPL-CCNC: 13 U/L
ANION GAP SERPL CALC-SCNC: 15 MMOL/L
AST SERPL-CCNC: 19 U/L
BILIRUB SERPL-MCNC: 0.5 MG/DL
BUN SERPL-MCNC: 32 MG/DL
CALCIUM SERPL-MCNC: 10 MG/DL
CHLORIDE SERPL-SCNC: 102 MMOL/L
CO2 SERPL-SCNC: 22 MMOL/L
CREAT SERPL-MCNC: 1.5 MG/DL
EGFR: 47 ML/MIN/1.73M2
GLUCOSE SERPL-MCNC: 120 MG/DL
HCT VFR BLD CALC: 36.4 %
HGB BLD-MCNC: 13.1 G/DL
LDH SERPL-CCNC: 183 U/L
MCHC RBC-ENTMCNC: 32.6 PG
MCHC RBC-ENTMCNC: 36 G/DL
MCV RBC AUTO: 90.5 FL
PLATELET # BLD AUTO: 230 K/UL
PMV BLD: 10 FL
POTASSIUM SERPL-SCNC: 5.4 MMOL/L
PROT SERPL-MCNC: 7.6 G/DL
RBC # BLD: 4.02 M/UL
RBC # FLD: 12 %
SODIUM SERPL-SCNC: 139 MMOL/L
WBC # FLD AUTO: 7.12 K/UL

## 2022-05-19 PROCEDURE — 99214 OFFICE O/P EST MOD 30 MIN: CPT

## 2022-05-20 DIAGNOSIS — C82.90 FOLLICULAR LYMPHOMA, UNSPECIFIED, UNSPECIFIED SITE: ICD-10-CM

## 2022-05-20 NOTE — HISTORY OF PRESENT ILLNESS
[Disease:__________________________] : Disease: [unfilled] [de-identified] : The patient is coming for his regularly scheduled follow up for his lymphoma, most likely follicular. \par His disease was involving the femoral condyles but he had an excellent response to 6 cycles of obinutuzumab and bendamustine (completed in 2017). Since then there was a question whether there was residual disease or local relapse but that was never confirmed; if anything the follow up scans (MRI) were better and the impression was that the patient had most likely significant tricompartmental degenerative change. In addition, he had a torn ACL graft and popliteal Baker's cyst.\par His appetite is stable. He is denying any fever or night sweat.\par However, according to his wife, he has not been active at all and spends 15 hours a day sleeping or on the couch/bed.\par He has not felt any new lumps or bumps.\par He has also his chronic low back pain which is preventing him from proceeding with any significant exercise.\par  [de-identified] : Recurrent [de-identified] : Follicular

## 2022-05-20 NOTE — REVIEW OF SYSTEMS
[Fatigue] : fatigue [Loss of Hearing] : loss of hearing [SOB on Exertion] : shortness of breath during exertion [Joint Pain] : joint pain [Joint Stiffness] : joint stiffness [Anxiety] : anxiety [Negative] : Heme/Lymph

## 2022-05-20 NOTE — ASSESSMENT
[FreeTextEntry1] : History of relapsed follicular lymphoma affecting also the femora in the past, presently with no evidence of disease clinically.\par The situation was discussed at length with the patient and his wife.\par He was encouraged to become physically more active to a degree that his back can tolerate. He was advised to use antiinflammatory medications and analgesics as needed and to follow up with his neurologist and a pain management physician.\par In the meantime, will repeat the blood work including CBC, CMP, LDH, IgG.\par If any significant abnormality, will reorder a PET scan and possibly MRI as indicated.\par \par All questions answered.\par \par Follow up in 6 months and as needed.

## 2022-05-20 NOTE — PHYSICAL EXAM
[Ambulatory and capable of all self care but unable to carry out any work activities] : Status 2- Ambulatory and capable of all self care but unable to carry out any work activities. Up and about more than 50% of waking hours [Normal] : affect appropriate [de-identified] : Same previous surgical scar midline. [de-identified] : Arthritic changes present

## 2022-05-23 LAB — IGG SER QL IEP: 1098 MG/DL

## 2022-10-04 NOTE — REVIEW OF SYSTEMS
[Joint Pain] : joint pain [Fatigue] : fatigue [Joint Stiffness] : joint stiffness [Difficulty Walking] : difficulty walking [Negative] : Heme/Lymph negative...

## 2022-11-17 ENCOUNTER — OUTPATIENT (OUTPATIENT)
Dept: OUTPATIENT SERVICES | Facility: HOSPITAL | Age: 77
LOS: 1 days | End: 2022-11-17

## 2022-11-17 ENCOUNTER — APPOINTMENT (OUTPATIENT)
Dept: HEMATOLOGY ONCOLOGY | Facility: CLINIC | Age: 77
End: 2022-11-17

## 2022-11-17 VITALS
RESPIRATION RATE: 14 BRPM | WEIGHT: 175 LBS | HEIGHT: 68 IN | BODY MASS INDEX: 26.52 KG/M2 | DIASTOLIC BLOOD PRESSURE: 68 MMHG | SYSTOLIC BLOOD PRESSURE: 115 MMHG | HEART RATE: 87 BPM | TEMPERATURE: 97.3 F

## 2022-11-17 LAB
BASOPHILS # BLD AUTO: 0.04 K/UL
BASOPHILS NFR BLD AUTO: 0.5 %
EOSINOPHIL # BLD AUTO: 0.19 K/UL
EOSINOPHIL NFR BLD AUTO: 2.4 %
HCT VFR BLD CALC: 41.6 %
HGB BLD-MCNC: 14.7 G/DL
IMM GRANULOCYTES NFR BLD AUTO: 0.3 %
LYMPHOCYTES # BLD AUTO: 2.65 K/UL
LYMPHOCYTES NFR BLD AUTO: 33.6 %
MAN DIFF?: NORMAL
MCHC RBC-ENTMCNC: 34.3 PG
MCHC RBC-ENTMCNC: 35.3 G/DL
MCV RBC AUTO: 97 FL
MONOCYTES # BLD AUTO: 0.89 K/UL
MONOCYTES NFR BLD AUTO: 11.3 %
NEUTROPHILS # BLD AUTO: 4.1 K/UL
NEUTROPHILS NFR BLD AUTO: 51.9 %
PLATELET # BLD AUTO: 222 K/UL
RBC # BLD: 4.29 M/UL
RBC # FLD: 11.9 %
WBC # FLD AUTO: 7.89 K/UL

## 2022-11-17 PROCEDURE — 99214 OFFICE O/P EST MOD 30 MIN: CPT

## 2022-11-18 DIAGNOSIS — M54.9 DORSALGIA, UNSPECIFIED: ICD-10-CM

## 2022-11-18 DIAGNOSIS — R63.4 ABNORMAL WEIGHT LOSS: ICD-10-CM

## 2022-11-18 DIAGNOSIS — C82.90 FOLLICULAR LYMPHOMA, UNSPECIFIED, UNSPECIFIED SITE: ICD-10-CM

## 2022-11-18 LAB
ALBUMIN SERPL ELPH-MCNC: 4.9 G/DL
ALP BLD-CCNC: 109 U/L
ALT SERPL-CCNC: 13 U/L
ANION GAP SERPL CALC-SCNC: 18 MMOL/L
AST SERPL-CCNC: 20 U/L
BILIRUB SERPL-MCNC: 0.6 MG/DL
BUN SERPL-MCNC: 31 MG/DL
CALCIUM SERPL-MCNC: 10.6 MG/DL
CHLORIDE SERPL-SCNC: 99 MMOL/L
CO2 SERPL-SCNC: 23 MMOL/L
CREAT SERPL-MCNC: 1.6 MG/DL
EGFR: 44 ML/MIN/1.73M2
GLUCOSE SERPL-MCNC: 129 MG/DL
IGG SER QL IEP: 1190 MG/DL
LDH SERPL-CCNC: 213 U/L
POTASSIUM SERPL-SCNC: 6.2 MMOL/L
PROT SERPL-MCNC: 7.9 G/DL
SODIUM SERPL-SCNC: 140 MMOL/L

## 2022-11-18 NOTE — REVIEW OF SYSTEMS
[Fatigue] : fatigue [Loss of Hearing] : loss of hearing [SOB on Exertion] : shortness of breath during exertion [Joint Pain] : joint pain [Joint Stiffness] : joint stiffness [Difficulty Walking] : difficulty walking [Negative] : Heme/Lymph [FreeTextEntry3] : S/P cataract surgery [FreeTextEntry9] : Especially the back interfering with his ambulation and other activities [de-identified] : From his back

## 2022-11-18 NOTE — PHYSICAL EXAM
[Normal] : affect appropriate [Ambulatory and capable of all self care but unable to carry out any work activities] : Status 2- Ambulatory and capable of all self care but unable to carry out any work activities. Up and about more than 50% of waking hours [de-identified] : But has lost at least 7-10 lbs since the last weight [de-identified] : Some arthritic changes

## 2022-11-18 NOTE — HISTORY OF PRESENT ILLNESS
[Disease:__________________________] : Disease: [unfilled] [de-identified] : The patient is coming for his regularly scheduled follow up for his lymphoma (most likely follicular), recurrent, last treated in 2017 achieving essentially complete remission.\par He has not changed much since his last visit this past May, in terms of his general condition and activity level. He is still "sleeping" 15 hours a day. He states that he cannot do much physically due to his back problems. His spinal surgery has not helped him and actually made him worse according to him and his wife. His appetite has decreased and he is losing weight. He is denying any fever.\par He is on no new medications.\par He is also followed by all his other physicians. [de-identified] : Recurrent, presently RADHA [de-identified] : Follicular

## 2022-11-18 NOTE — ASSESSMENT
[FreeTextEntry1] : History of recurrent follicular lymphoma, now in remission since 2017, but another relapse should be ruled out given his generally poor condition, weight loss, anorexia and lack of desire for any physical activity, this latter possibly related also to his back problems.\par The situation was discussed with the patient and his wife.\par At this time, will reevaluate the disease status with CBC, CMP, LDH, IgG and a PET scan.\par He was also recommended not to ignore at least a screening colonoscopy (he had in the past).\par \par Further recommendations after the above results are available.\par \par All questions answered.\par \par Will follow up sooner than in 6 months given his overall condition.

## 2023-05-18 ENCOUNTER — APPOINTMENT (OUTPATIENT)
Dept: HEMATOLOGY ONCOLOGY | Facility: CLINIC | Age: 78
End: 2023-05-18
Payer: MEDICARE

## 2023-05-18 ENCOUNTER — LABORATORY RESULT (OUTPATIENT)
Age: 78
End: 2023-05-18

## 2023-05-18 ENCOUNTER — OUTPATIENT (OUTPATIENT)
Dept: OUTPATIENT SERVICES | Facility: HOSPITAL | Age: 78
LOS: 1 days | End: 2023-05-18
Payer: MEDICARE

## 2023-05-18 ENCOUNTER — APPOINTMENT (OUTPATIENT)
Dept: HEMATOLOGY ONCOLOGY | Facility: CLINIC | Age: 78
End: 2023-05-18

## 2023-05-18 VITALS
DIASTOLIC BLOOD PRESSURE: 82 MMHG | RESPIRATION RATE: 16 BRPM | WEIGHT: 178 LBS | HEART RATE: 60 BPM | SYSTOLIC BLOOD PRESSURE: 149 MMHG | HEIGHT: 68 IN | BODY MASS INDEX: 26.98 KG/M2 | TEMPERATURE: 98.1 F

## 2023-05-18 DIAGNOSIS — C85.90 NON-HODGKIN LYMPHOMA, UNSPECIFIED, UNSPECIFIED SITE: ICD-10-CM

## 2023-05-18 DIAGNOSIS — Z80.0 FAMILY HISTORY OF MALIGNANT NEOPLASM OF DIGESTIVE ORGANS: ICD-10-CM

## 2023-05-18 LAB
ALBUMIN SERPL ELPH-MCNC: 4.2 G/DL
ALP BLD-CCNC: 99 U/L
ALT SERPL-CCNC: 12 U/L
ANION GAP SERPL CALC-SCNC: 12 MMOL/L
AST SERPL-CCNC: 17 U/L
BILIRUB SERPL-MCNC: 0.5 MG/DL
BUN SERPL-MCNC: 33 MG/DL
CALCIUM SERPL-MCNC: 10 MG/DL
CHLORIDE SERPL-SCNC: 100 MMOL/L
CO2 SERPL-SCNC: 27 MMOL/L
CREAT SERPL-MCNC: 1.3 MG/DL
EGFR: 56 ML/MIN/1.73M2
GLUCOSE SERPL-MCNC: 108 MG/DL
HCT VFR BLD CALC: 39.4 %
HGB BLD-MCNC: 13.2 G/DL
LDH SERPL-CCNC: 210 U/L
MCHC RBC-ENTMCNC: 30.6 PG
MCHC RBC-ENTMCNC: 33.5 G/DL
MCV RBC AUTO: 91.4 FL
PLATELET # BLD AUTO: 237 K/UL
PMV BLD: 9.5 FL
POTASSIUM SERPL-SCNC: 5.7 MMOL/L
PROT SERPL-MCNC: 7.3 G/DL
RBC # BLD: 4.31 M/UL
RBC # FLD: 12.8 %
SODIUM SERPL-SCNC: 139 MMOL/L
WBC # FLD AUTO: 6.93 K/UL

## 2023-05-18 PROCEDURE — 85027 COMPLETE CBC AUTOMATED: CPT

## 2023-05-18 PROCEDURE — 99214 OFFICE O/P EST MOD 30 MIN: CPT

## 2023-05-18 PROCEDURE — 80053 COMPREHEN METABOLIC PANEL: CPT

## 2023-05-18 PROCEDURE — 36415 COLL VENOUS BLD VENIPUNCTURE: CPT

## 2023-05-18 PROCEDURE — 83615 LACTATE (LD) (LDH) ENZYME: CPT

## 2023-05-19 DIAGNOSIS — C85.90 NON-HODGKIN LYMPHOMA, UNSPECIFIED, UNSPECIFIED SITE: ICD-10-CM

## 2023-05-19 DIAGNOSIS — C82.90 FOLLICULAR LYMPHOMA, UNSPECIFIED, UNSPECIFIED SITE: ICD-10-CM

## 2023-05-19 NOTE — HISTORY OF PRESENT ILLNESS
[Disease:__________________________] : Disease: [unfilled] [de-identified] : The patient is coming for his regularly scheduled follow up for his lymphoma, most likely follicular, recurrent. The patient states that he had skin lymphoma back in 1975 treated with surgical resections since they were mostly cutaneous apparently. Then the disease was seen in the sternum at least 10 years ago treated with Rituxan only but apparently that didn't do much. Then he had recurrence again in the left femur-knees and he was treated with obinutuzumab and bendamustine.\par He is S/P back surgery following which he did not see any improvement and he feels unsteady when he walks.\par No fever or night sweats. The appetite is stable. He has not felt any new lumps or bumps.\par   [de-identified] : follicular

## 2023-05-19 NOTE — REVIEW OF SYSTEMS
[Fatigue] : fatigue [Loss of Hearing] : loss of hearing [Constipation] : constipation [Joint Pain] : joint pain [Joint Stiffness] : joint stiffness [Difficulty Walking] : difficulty walking [Negative] : Heme/Lymph [FreeTextEntry6] : But not doing much physically [FreeTextEntry9] : Pain in lower back

## 2023-05-19 NOTE — PHYSICAL EXAM
[Ambulatory and capable of all self care but unable to carry out any work activities] : Status 2- Ambulatory and capable of all self care but unable to carry out any work activities. Up and about more than 50% of waking hours [Normal] : affect appropriate [de-identified] : Arthritic changes

## 2023-05-19 NOTE — ASSESSMENT
[FreeTextEntry1] : Recurrent follicular lymphoma, clinically remaining in remission.\par The patient's problems right now are more musculoskeletal then related to the history of lymphoma. he has not found any significant relief post spine surgery.\par The situation was discussed with the patient and his wife. \par We will obtain CBC, CMP, LDH as part of this evaluation. If any significant abnormality noted, will get a PET scan since his relapse was in the knees.\par \par All questions answered.\par \par Further recommendations after the blood results are available.\par \par If all stable, F/U in 6 months and as needed.

## 2023-11-30 ENCOUNTER — APPOINTMENT (OUTPATIENT)
Dept: HEMATOLOGY ONCOLOGY | Facility: CLINIC | Age: 78
End: 2023-11-30
Payer: MEDICARE

## 2023-11-30 ENCOUNTER — LABORATORY RESULT (OUTPATIENT)
Age: 78
End: 2023-11-30

## 2023-11-30 ENCOUNTER — OUTPATIENT (OUTPATIENT)
Dept: OUTPATIENT SERVICES | Facility: HOSPITAL | Age: 78
LOS: 1 days | End: 2023-11-30
Payer: MEDICARE

## 2023-11-30 VITALS
RESPIRATION RATE: 16 BRPM | BODY MASS INDEX: 25.01 KG/M2 | DIASTOLIC BLOOD PRESSURE: 67 MMHG | HEIGHT: 68 IN | HEART RATE: 56 BPM | WEIGHT: 165 LBS | SYSTOLIC BLOOD PRESSURE: 150 MMHG | TEMPERATURE: 97.7 F

## 2023-11-30 DIAGNOSIS — C85.90 NON-HODGKIN LYMPHOMA, UNSPECIFIED, UNSPECIFIED SITE: ICD-10-CM

## 2023-11-30 LAB
ALBUMIN SERPL ELPH-MCNC: 4.4 G/DL
ALP BLD-CCNC: 90 U/L
ALT SERPL-CCNC: 9 U/L
ANION GAP SERPL CALC-SCNC: 13 MMOL/L
AST SERPL-CCNC: 17 U/L
BILIRUB SERPL-MCNC: 0.6 MG/DL
BUN SERPL-MCNC: 30 MG/DL
CALCIUM SERPL-MCNC: 9.8 MG/DL
CHLORIDE SERPL-SCNC: 102 MMOL/L
CO2 SERPL-SCNC: 25 MMOL/L
CREAT SERPL-MCNC: 1.6 MG/DL
EGFR: 43 ML/MIN/1.73M2
GLUCOSE SERPL-MCNC: 106 MG/DL
HCT VFR BLD CALC: 37.7 %
HGB BLD-MCNC: 12.9 G/DL
LDH SERPL-CCNC: 212 U/L
MCHC RBC-ENTMCNC: 31.4 PG
MCHC RBC-ENTMCNC: 34.2 G/DL
MCV RBC AUTO: 91.7 FL
PLATELET # BLD AUTO: 152 K/UL
PMV BLD: 10.2 FL
POTASSIUM SERPL-SCNC: 5.2 MMOL/L
PROT SERPL-MCNC: 7.3 G/DL
RBC # BLD: 4.11 M/UL
RBC # FLD: 12.4 %
SODIUM SERPL-SCNC: 140 MMOL/L
WBC # FLD AUTO: 5.82 K/UL

## 2023-11-30 PROCEDURE — 99204 OFFICE O/P NEW MOD 45 MIN: CPT

## 2023-11-30 PROCEDURE — 36415 COLL VENOUS BLD VENIPUNCTURE: CPT

## 2023-11-30 PROCEDURE — 82232 ASSAY OF BETA-2 PROTEIN: CPT

## 2023-11-30 PROCEDURE — 83615 LACTATE (LD) (LDH) ENZYME: CPT

## 2023-11-30 PROCEDURE — 85027 COMPLETE CBC AUTOMATED: CPT

## 2023-11-30 PROCEDURE — 80053 COMPREHEN METABOLIC PANEL: CPT

## 2023-11-30 PROCEDURE — 99214 OFFICE O/P EST MOD 30 MIN: CPT

## 2023-12-01 DIAGNOSIS — C85.90 NON-HODGKIN LYMPHOMA, UNSPECIFIED, UNSPECIFIED SITE: ICD-10-CM

## 2023-12-01 LAB — B2 MICROGLOB SERPL-MCNC: 4 MG/L

## 2024-06-11 ENCOUNTER — APPOINTMENT (OUTPATIENT)
Age: 79
End: 2024-06-11

## 2024-08-05 ENCOUNTER — LABORATORY RESULT (OUTPATIENT)
Age: 79
End: 2024-08-05

## 2024-08-05 ENCOUNTER — APPOINTMENT (OUTPATIENT)
Age: 79
End: 2024-08-05

## 2024-08-05 ENCOUNTER — OUTPATIENT (OUTPATIENT)
Dept: OUTPATIENT SERVICES | Facility: HOSPITAL | Age: 79
LOS: 1 days | End: 2024-08-05
Payer: MEDICARE

## 2024-08-05 DIAGNOSIS — C85.90 NON-HODGKIN LYMPHOMA, UNSPECIFIED, UNSPECIFIED SITE: ICD-10-CM

## 2024-08-05 PROCEDURE — 85027 COMPLETE CBC AUTOMATED: CPT

## 2024-08-05 PROCEDURE — 82232 ASSAY OF BETA-2 PROTEIN: CPT

## 2024-08-05 PROCEDURE — 83615 LACTATE (LD) (LDH) ENZYME: CPT

## 2024-08-05 PROCEDURE — 83735 ASSAY OF MAGNESIUM: CPT

## 2024-08-05 PROCEDURE — 82607 VITAMIN B-12: CPT

## 2024-08-05 PROCEDURE — 99214 OFFICE O/P EST MOD 30 MIN: CPT

## 2024-08-05 PROCEDURE — 36415 COLL VENOUS BLD VENIPUNCTURE: CPT

## 2024-08-05 PROCEDURE — 80053 COMPREHEN METABOLIC PANEL: CPT

## 2024-08-05 NOTE — HISTORY OF PRESENT ILLNESS
[Disease:__________________________] : Disease: [unfilled] [de-identified] : The patient is coming for a follow up for his recurrent lymphoma (most likely follicular) which was recurrent and treated and put in remission. (For further details, see the previous notes). The disease was originally diagnosed in 2011, treated, put in remission, retreated upon recurrence in 2016 with Gazyva and bendamustine achieving remission again. His reevaluation, a few months ago, was negative for any obvious evidence of recurrent disease. His major problem right now is his back which is, not only hurting, but also preventing him from walking normally. He is trying to proceed with acupuncture and to find out if he can get physical therapy. He had some PT a year, a year and a half ago, and that had stabilized the condition. He had back surgery but that did not make any difference. A few weeks ago, he had his back stimulator removed since it "wasn't doing any good". Otherwise, no fever or night sweats. Appetite is still not great and losing weight although not drastic.    [de-identified] : 8/[birth year] 79 year old male presents for follow up for follicular lymphoma, recurrent. He continues to suffer from chronic back pain, which affects his ability to walk. He is s/p multilevel disk fusion surgery. He otherwise denies fever, chills or night sweats. Weight remains stable.

## 2024-08-05 NOTE — REVIEW OF SYSTEMS
[Loss of Hearing] : loss of hearing [Joint Pain] : joint pain [Joint Stiffness] : joint stiffness [Difficulty Walking] : difficulty walking [Negative] : Eyes [Fatigue] : no fatigue [Recent Change In Weight] : ~T no recent weight change [Vision Problems] : no vision problems [SOB on Exertion] : no shortness of breath during exertion [FreeTextEntry9] : Especially the back

## 2024-08-05 NOTE — PHYSICAL EXAM
[Ambulatory and capable of all self care but unable to carry out any work activities] : Status 2- Ambulatory and capable of all self care but unable to carry out any work activities. Up and about more than 50% of waking hours [Normal] : affect appropriate [de-identified] : Severe arthritic changes

## 2024-08-05 NOTE — ASSESSMENT
[FreeTextEntry1] : Follicular lymphoma, low grade, recurrent, retreated upon recurrence in [id number] with Gazyva and bendamustine, clinically and radiologically remaining in remission.  The patient's major problem at this time is his back for which he is followed by orthopedist and pain management. We will draw blood work including CBC, CMP, LDH, beta-2 microglobulin.  Further recommendations after the above results are available. All questions answered. If all within acceptable range, he will be seen again in 6 months for follow up and as needed.

## 2024-08-06 ENCOUNTER — APPOINTMENT (OUTPATIENT)
Age: 79
End: 2024-08-06

## 2024-08-06 DIAGNOSIS — C85.90 NON-HODGKIN LYMPHOMA, UNSPECIFIED, UNSPECIFIED SITE: ICD-10-CM

## 2024-09-24 ENCOUNTER — NON-APPOINTMENT (OUTPATIENT)
Age: 79
End: 2024-09-24

## 2024-09-25 ENCOUNTER — APPOINTMENT (OUTPATIENT)
Dept: NEUROSURGERY | Facility: CLINIC | Age: 79
End: 2024-09-25
Payer: MEDICARE

## 2024-09-25 VITALS — WEIGHT: 170 LBS | HEIGHT: 70 IN | BODY MASS INDEX: 24.34 KG/M2

## 2024-09-25 DIAGNOSIS — Z80.9 FAMILY HISTORY OF MALIGNANT NEOPLASM, UNSPECIFIED: ICD-10-CM

## 2024-09-25 DIAGNOSIS — Z86.79 PERSONAL HISTORY OF OTHER DISEASES OF THE CIRCULATORY SYSTEM: ICD-10-CM

## 2024-09-25 DIAGNOSIS — Z80.0 FAMILY HISTORY OF MALIGNANT NEOPLASM OF DIGESTIVE ORGANS: ICD-10-CM

## 2024-09-25 DIAGNOSIS — Z87.39 PERSONAL HISTORY OF OTHER DISEASES OF THE MUSCULOSKELETAL SYSTEM AND CONNECTIVE TISSUE: ICD-10-CM

## 2024-09-25 DIAGNOSIS — Z82.49 FAMILY HISTORY OF ISCHEMIC HEART DISEASE AND OTHER DISEASES OF THE CIRCULATORY SYSTEM: ICD-10-CM

## 2024-09-25 DIAGNOSIS — Z85.72 PERSONAL HISTORY OF NON-HODGKIN LYMPHOMAS: ICD-10-CM

## 2024-09-25 DIAGNOSIS — Z83.3 FAMILY HISTORY OF DIABETES MELLITUS: ICD-10-CM

## 2024-09-25 DIAGNOSIS — M48.061 SPINAL STENOSIS, LUMBAR REGION WITHOUT NEUROGENIC CLAUDICATION: ICD-10-CM

## 2024-09-25 DIAGNOSIS — Z82.61 FAMILY HISTORY OF ARTHRITIS: ICD-10-CM

## 2024-09-25 DIAGNOSIS — M48.04 SPINAL STENOSIS, THORACIC REGION: ICD-10-CM

## 2024-09-25 DIAGNOSIS — Z86.69 PERSONAL HISTORY OF OTHER DISEASES OF THE NERVOUS SYSTEM AND SENSE ORGANS: ICD-10-CM

## 2024-09-25 PROCEDURE — 99203 OFFICE O/P NEW LOW 30 MIN: CPT

## 2024-09-26 NOTE — ASSESSMENT
[FreeTextEntry1] : Mr. 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 I have recommended additional testing for surgical planning. He will proceed with the following at [facility] in [place]:   - CT lumbar spine to assess his fusion / hardware, r/o pseudoarthrosis   - MRI thoracic spine to assess the extent of his thoracic stenosis.   - Scoliosis x-rays to evaluate his spinal alignment.   I would like to do a telehealth visit with the patient and his wife once testing is completed to discuss treatment options. Patient is agreeable with our plan of care. All questions answered today.   I personally reviewed with the PA, this patient's history and physical exam findings, as documented above. I have discussed the relevant areas of concern, having direct implications to the presenting problems and illnesses, and I have personally examined all pertinent and positive and negative findings, which impact their neurosurgical treatment.  MS GILBERTO MoeC Senior Physician Assistant [facility]    Katrin Orta MD FAANS Chair, Department of Neurosurgery [facility]

## 2024-09-26 NOTE — ASSESSMENT
[FreeTextEntry1] : Mr. 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 I have recommended additional testing for surgical planning. He will proceed with the following at [facility] in [place]:   - CT lumbar spine to assess his fusion / hardware, r/o pseudoarthrosis   - MRI thoracic spine to assess the extent of his thoracic stenosis.   - Scoliosis x-rays to evaluate his spinal alignment.   I would like to do a telehealth visit with the patient and his wife once testing is completed to discuss treatment options. Patient is agreeable with our plan of care. All questions answered today.   I personally reviewed with the PA, this patient's history and physical exam findings, as documented above. I have discussed the relevant areas of concern, having direct implications to the presenting problems and illnesses, and I have personally examined all pertinent and positive and negative findings, which impact their neurosurgical treatment.  MS GILBERTO MoeC Senior Physician Assistant [facility]    Katrin Orta MD FAANS Chair, Department of Neurosurgery [facility]

## 2024-09-26 NOTE — HISTORY OF PRESENT ILLNESS
[de-identified] : Mr. PARISI is an 79-year-old gentleman who developed severe lower back pain with neurogenic claudication and lower extremity weakness in 2019.  Subsequently he was found to have multilevel degenerative disease with associated stenosis.  After failure to improve with conservative management he proceeded with an L1-S1 interbody fusion with stabilization in 2020 by a specialist at [facility].  Postoperatively he completed physical therapy.  He has a chronic right foot drop which he states developed after his surgery. He did disclose that he had limited follow-up visits with his surgeon postoperatively.  Over the past year he has developed a significant increase in back pain with weakness in the bilateral lower extremities.  Right foot drop remains unchanged.  He has undergone extensive physical therapy and multiple injections with pain management.  He even had a spinal cord stimulator placed years ago which was ventrally removed since it was ineffective.  In May he was seen by his physiatrist and an updated MRI/x-ray of the lumbar spine was ordered.  The studies were reviewed today.  -X-rays of the lumbar spine from [facility] 5/2024: Postoperative changes status post L1-S1 interbody fusion.  -MRI lumbar spine from [facility] 5/2024: Postoperative changes with adjacent segment degeneration/stenosis T12-L1 with signal changes suggesting myelomalacia.

## 2024-09-26 NOTE — HISTORY OF PRESENT ILLNESS
[de-identified] : Mr. PARISI is an 79-year-old gentleman who developed severe lower back pain with neurogenic claudication and lower extremity weakness in 2019.  Subsequently he was found to have multilevel degenerative disease with associated stenosis.  After failure to improve with conservative management he proceeded with an L1-S1 interbody fusion with stabilization in 2020 by a specialist at [facility].  Postoperatively he completed physical therapy.  He has a chronic right foot drop which he states developed after his surgery. He did disclose that he had limited follow-up visits with his surgeon postoperatively.  Over the past year he has developed a significant increase in back pain with weakness in the bilateral lower extremities.  Right foot drop remains unchanged.  He has undergone extensive physical therapy and multiple injections with pain management.  He even had a spinal cord stimulator placed years ago which was ventrally removed since it was ineffective.  In May he was seen by his physiatrist and an updated MRI/x-ray of the lumbar spine was ordered.  The studies were reviewed today.  -X-rays of the lumbar spine from [facility] 5/2024: Postoperative changes status post L1-S1 interbody fusion.  -MRI lumbar spine from [facility] 5/2024: Postoperative changes with adjacent segment degeneration/stenosis T12-L1 with signal changes suggesting myelomalacia.

## 2024-10-16 ENCOUNTER — APPOINTMENT (OUTPATIENT)
Dept: NEUROSURGERY | Facility: CLINIC | Age: 79
End: 2024-10-16
Payer: MEDICARE

## 2024-10-16 DIAGNOSIS — M48.04 SPINAL STENOSIS, THORACIC REGION: ICD-10-CM

## 2024-10-16 PROCEDURE — 99443: CPT | Mod: 93

## 2024-12-11 NOTE — ASU PATIENT PROFILE, ADULT - NSICDXPASTSURGICALHX_GEN_ALL_CORE_FT
PAST SURGICAL HISTORY:  H/O left knee surgery     H/O toe surgery     S/P cholecystectomy     S/P right hemicolectomy     S/P spinal surgery

## 2024-12-11 NOTE — ASU PATIENT PROFILE, ADULT - NS PREOP UNDERSTANDS INFO
yes H/O carpal tunnel repair  Right  H/O carpal tunnel repair  left 2019  H/O: Knee Surgery  s/p Right Arthroscopy  History of Basal Cell Carcinoma  s/p MOHS ?   S/P arthroscopy  Left Knee  S/P bilateral mastectomy  dec 2018  S/P  Section  times three  S/P colonoscopy    S/P tonsillectomy    Status post D&C  2021  Syncopal Episodes

## 2024-12-12 ENCOUNTER — INPATIENT (INPATIENT)
Facility: HOSPITAL | Age: 79
LOS: 6 days | Discharge: NURSING, REHAB OR LONG-TERM CARE | DRG: 448 | End: 2024-12-19
Attending: STUDENT IN AN ORGANIZED HEALTH CARE EDUCATION/TRAINING PROGRAM | Admitting: STUDENT IN AN ORGANIZED HEALTH CARE EDUCATION/TRAINING PROGRAM
Payer: MEDICARE

## 2024-12-12 ENCOUNTER — APPOINTMENT (OUTPATIENT)
Dept: ORTHOPEDIC SURGERY | Facility: HOSPITAL | Age: 79
End: 2024-12-12

## 2024-12-12 VITALS
HEART RATE: 52 BPM | TEMPERATURE: 98 F | SYSTOLIC BLOOD PRESSURE: 147 MMHG | HEIGHT: 70.5 IN | RESPIRATION RATE: 16 BRPM | WEIGHT: 165.57 LBS | DIASTOLIC BLOOD PRESSURE: 66 MMHG | OXYGEN SATURATION: 98 %

## 2024-12-12 DIAGNOSIS — M48.04 SPINAL STENOSIS, THORACIC REGION: ICD-10-CM

## 2024-12-12 DIAGNOSIS — Z90.49 ACQUIRED ABSENCE OF OTHER SPECIFIED PARTS OF DIGESTIVE TRACT: Chronic | ICD-10-CM

## 2024-12-12 DIAGNOSIS — M48.061 SPINAL STENOSIS, LUMBAR REGION WITHOUT NEUROGENIC CLAUDICATION: ICD-10-CM

## 2024-12-12 DIAGNOSIS — Z98.890 OTHER SPECIFIED POSTPROCEDURAL STATES: Chronic | ICD-10-CM

## 2024-12-12 LAB
ANION GAP SERPL CALC-SCNC: 13 MMOL/L — SIGNIFICANT CHANGE UP (ref 7–14)
BLD GP AB SCN SERPL QL: SIGNIFICANT CHANGE UP
BUN SERPL-MCNC: 34 MG/DL — HIGH (ref 10–20)
CALCIUM SERPL-MCNC: 9.3 MG/DL — SIGNIFICANT CHANGE UP (ref 8.4–10.5)
CHLORIDE SERPL-SCNC: 102 MMOL/L — SIGNIFICANT CHANGE UP (ref 98–110)
CO2 SERPL-SCNC: 23 MMOL/L — SIGNIFICANT CHANGE UP (ref 17–32)
CREAT SERPL-MCNC: 1.5 MG/DL — SIGNIFICANT CHANGE UP (ref 0.7–1.5)
EGFR: 46 ML/MIN/1.73M2 — LOW
GLUCOSE SERPL-MCNC: 110 MG/DL — HIGH (ref 70–99)
HCT VFR BLD CALC: 31.9 % — LOW (ref 42–52)
HCT VFR BLD CALC: 36.4 % — LOW (ref 42–52)
HGB BLD-MCNC: 11 G/DL — LOW (ref 14–18)
HGB BLD-MCNC: 12.7 G/DL — LOW (ref 14–18)
MCHC RBC-ENTMCNC: 32.6 PG — HIGH (ref 27–31)
MCHC RBC-ENTMCNC: 34.5 G/DL — SIGNIFICANT CHANGE UP (ref 32–37)
MCHC RBC-ENTMCNC: 34.5 PG — HIGH (ref 27–31)
MCHC RBC-ENTMCNC: 34.9 G/DL — SIGNIFICANT CHANGE UP (ref 32–37)
MCV RBC AUTO: 100 FL — HIGH (ref 80–94)
MCV RBC AUTO: 93.6 FL — SIGNIFICANT CHANGE UP (ref 80–94)
NRBC # BLD: 0 /100 WBCS — SIGNIFICANT CHANGE UP (ref 0–0)
NRBC # BLD: 0 /100 WBCS — SIGNIFICANT CHANGE UP (ref 0–0)
PLATELET # BLD AUTO: 125 K/UL — LOW (ref 130–400)
PLATELET # BLD AUTO: 165 K/UL — SIGNIFICANT CHANGE UP (ref 130–400)
PMV BLD: 10.8 FL — HIGH (ref 7.4–10.4)
PMV BLD: 11.1 FL — HIGH (ref 7.4–10.4)
POTASSIUM SERPL-MCNC: 4.7 MMOL/L — SIGNIFICANT CHANGE UP (ref 3.5–5)
POTASSIUM SERPL-SCNC: 4.7 MMOL/L — SIGNIFICANT CHANGE UP (ref 3.5–5)
RBC # BLD: 3.19 M/UL — LOW (ref 4.7–6.1)
RBC # BLD: 3.89 M/UL — LOW (ref 4.7–6.1)
RBC # FLD: 12.2 % — SIGNIFICANT CHANGE UP (ref 11.5–14.5)
RBC # FLD: 12.4 % — SIGNIFICANT CHANGE UP (ref 11.5–14.5)
SODIUM SERPL-SCNC: 138 MMOL/L — SIGNIFICANT CHANGE UP (ref 135–146)
WBC # BLD: 5.27 K/UL — SIGNIFICANT CHANGE UP (ref 4.8–10.8)
WBC # BLD: 5.82 K/UL — SIGNIFICANT CHANGE UP (ref 4.8–10.8)
WBC # FLD AUTO: 5.27 K/UL — SIGNIFICANT CHANGE UP (ref 4.8–10.8)
WBC # FLD AUTO: 5.82 K/UL — SIGNIFICANT CHANGE UP (ref 4.8–10.8)

## 2024-12-12 PROCEDURE — 22610 ARTHRD PST TQ 1NTRSPC THRC: CPT

## 2024-12-12 PROCEDURE — 97535 SELF CARE MNGMENT TRAINING: CPT | Mod: GO

## 2024-12-12 PROCEDURE — 85027 COMPLETE CBC AUTOMATED: CPT

## 2024-12-12 PROCEDURE — 97530 THERAPEUTIC ACTIVITIES: CPT | Mod: GP

## 2024-12-12 PROCEDURE — 97162 PT EVAL MOD COMPLEX 30 MIN: CPT | Mod: GP

## 2024-12-12 PROCEDURE — 97166 OT EVAL MOD COMPLEX 45 MIN: CPT | Mod: GO

## 2024-12-12 PROCEDURE — 22859 INSJ BIOMECHANICAL DEVICE: CPT | Mod: 82

## 2024-12-12 PROCEDURE — C1751: CPT

## 2024-12-12 PROCEDURE — C1713: CPT

## 2024-12-12 PROCEDURE — 22614 ARTHRD PST TQ 1NTRSPC EA ADD: CPT

## 2024-12-12 PROCEDURE — 87635 SARS-COV-2 COVID-19 AMP PRB: CPT

## 2024-12-12 PROCEDURE — 97110 THERAPEUTIC EXERCISES: CPT | Mod: GP

## 2024-12-12 PROCEDURE — C9399: CPT

## 2024-12-12 PROCEDURE — 86901 BLOOD TYPING SEROLOGIC RH(D): CPT

## 2024-12-12 PROCEDURE — 22830 EXPLORATION OF SPINAL FUSION: CPT | Mod: 59

## 2024-12-12 PROCEDURE — 61783 SCAN PROC SPINAL: CPT | Mod: 59

## 2024-12-12 PROCEDURE — 63046 LAM FACETEC & FORAMOT THRC: CPT | Mod: 82

## 2024-12-12 PROCEDURE — C1889: CPT

## 2024-12-12 PROCEDURE — 85025 COMPLETE CBC W/AUTO DIFF WBC: CPT

## 2024-12-12 PROCEDURE — 86850 RBC ANTIBODY SCREEN: CPT

## 2024-12-12 PROCEDURE — 22843 INSERT SPINE FIXATION DEVICE: CPT | Mod: 82

## 2024-12-12 PROCEDURE — 72084 X-RAY EXAM ENTIRE SPI 6/> VW: CPT

## 2024-12-12 PROCEDURE — 97116 GAIT TRAINING THERAPY: CPT | Mod: GP

## 2024-12-12 PROCEDURE — 63048 LAM FACETEC &FORAMOT EA ADDL: CPT

## 2024-12-12 PROCEDURE — 22614 ARTHRD PST TQ 1NTRSPC EA ADD: CPT | Mod: 82

## 2024-12-12 PROCEDURE — 22610 ARTHRD PST TQ 1NTRSPC THRC: CPT | Mod: 82

## 2024-12-12 PROCEDURE — 63048 LAM FACETEC &FORAMOT EA ADDL: CPT | Mod: 82

## 2024-12-12 PROCEDURE — 94010 BREATHING CAPACITY TEST: CPT

## 2024-12-12 PROCEDURE — 86891 AUTOLOGOUS BLOOD OP SALVAGE: CPT

## 2024-12-12 PROCEDURE — 80048 BASIC METABOLIC PNL TOTAL CA: CPT

## 2024-12-12 PROCEDURE — 36415 COLL VENOUS BLD VENIPUNCTURE: CPT

## 2024-12-12 PROCEDURE — 82962 GLUCOSE BLOOD TEST: CPT

## 2024-12-12 PROCEDURE — 22842 INSERT SPINE FIXATION DEVICE: CPT

## 2024-12-12 PROCEDURE — 86900 BLOOD TYPING SEROLOGIC ABO: CPT

## 2024-12-12 PROCEDURE — 63046 LAM FACETEC & FORAMOT THRC: CPT

## 2024-12-12 RX ORDER — ACETAMINOPHEN 500MG 500 MG/1
650 TABLET, COATED ORAL EVERY 6 HOURS
Refills: 0 | Status: DISCONTINUED | OUTPATIENT
Start: 2024-12-12 | End: 2024-12-14

## 2024-12-12 RX ORDER — ENOXAPARIN SODIUM 30 MG/.3ML
40 INJECTION SUBCUTANEOUS EVERY 24 HOURS
Refills: 0 | Status: DISCONTINUED | OUTPATIENT
Start: 2024-12-13 | End: 2024-12-19

## 2024-12-12 RX ORDER — HYDROMORPHONE HYDROCHLORIDE 2 MG/1
30 TABLET ORAL
Refills: 0 | Status: DISCONTINUED | OUTPATIENT
Start: 2024-12-12 | End: 2024-12-14

## 2024-12-12 RX ORDER — OMEPRAZOLE 20 MG/1
1 CAPSULE, DELAYED RELEASE ORAL
Refills: 0 | DISCHARGE

## 2024-12-12 RX ORDER — ACETAMINOPHEN 500MG 500 MG/1
1000 TABLET, COATED ORAL ONCE
Refills: 0 | Status: COMPLETED | OUTPATIENT
Start: 2024-12-12 | End: 2024-12-12

## 2024-12-12 RX ORDER — CEFAZOLIN SODIUM 10 G
2000 VIAL (EA) INJECTION EVERY 8 HOURS
Refills: 0 | Status: COMPLETED | OUTPATIENT
Start: 2024-12-12 | End: 2024-12-13

## 2024-12-12 RX ORDER — ESCITALOPRAM OXALATE 10 MG/1
10 TABLET, FILM COATED ORAL DAILY
Refills: 0 | Status: DISCONTINUED | OUTPATIENT
Start: 2024-12-12 | End: 2024-12-19

## 2024-12-12 RX ORDER — 0.9 % SODIUM CHLORIDE 0.9 %
1000 INTRAVENOUS SOLUTION INTRAVENOUS
Refills: 0 | Status: DISCONTINUED | OUTPATIENT
Start: 2024-12-12 | End: 2024-12-12

## 2024-12-12 RX ORDER — HYDROMORPHONE HYDROCHLORIDE 2 MG/1
1 TABLET ORAL
Refills: 0 | Status: DISCONTINUED | OUTPATIENT
Start: 2024-12-12 | End: 2024-12-12

## 2024-12-12 RX ORDER — ROSUVASTATIN CALCIUM 5 MG/1
10 TABLET, FILM COATED ORAL AT BEDTIME
Refills: 0 | Status: DISCONTINUED | OUTPATIENT
Start: 2024-12-12 | End: 2024-12-19

## 2024-12-12 RX ORDER — NALOXONE HCL 0.4 MG/ML
0.1 AMPUL (ML) INJECTION
Refills: 0 | Status: DISCONTINUED | OUTPATIENT
Start: 2024-12-12 | End: 2024-12-19

## 2024-12-12 RX ORDER — ONDANSETRON HYDROCHLORIDE 4 MG/1
4 TABLET, FILM COATED ORAL ONCE
Refills: 0 | Status: DISCONTINUED | OUTPATIENT
Start: 2024-12-12 | End: 2024-12-12

## 2024-12-12 RX ORDER — ONDANSETRON HYDROCHLORIDE 4 MG/1
4 TABLET, FILM COATED ORAL EVERY 8 HOURS
Refills: 0 | Status: DISCONTINUED | OUTPATIENT
Start: 2024-12-12 | End: 2024-12-19

## 2024-12-12 RX ORDER — HYDROMORPHONE HYDROCHLORIDE 2 MG/1
0.5 TABLET ORAL
Refills: 0 | Status: DISCONTINUED | OUTPATIENT
Start: 2024-12-12 | End: 2024-12-12

## 2024-12-12 RX ORDER — APREPITANT 40 MG/1
40 CAPSULE ORAL ONCE
Refills: 0 | Status: COMPLETED | OUTPATIENT
Start: 2024-12-12 | End: 2024-12-12

## 2024-12-12 RX ORDER — ACETAMINOPHEN, DIPHENHYDRAMINE HCL, PHENYLEPHRINE HCL 325; 25; 5 MG/1; MG/1; MG/1
3 TABLET ORAL AT BEDTIME
Refills: 0 | Status: DISCONTINUED | OUTPATIENT
Start: 2024-12-12 | End: 2024-12-19

## 2024-12-12 RX ORDER — METOPROLOL TARTRATE 100 MG/1
50 TABLET, FILM COATED ORAL DAILY
Refills: 0 | Status: DISCONTINUED | OUTPATIENT
Start: 2024-12-12 | End: 2024-12-19

## 2024-12-12 RX ORDER — PANTOPRAZOLE SODIUM 40 MG/1
40 TABLET, DELAYED RELEASE ORAL
Refills: 0 | Status: DISCONTINUED | OUTPATIENT
Start: 2024-12-12 | End: 2024-12-19

## 2024-12-12 RX ORDER — POLYETHYLENE GLYCOL 3350 17 G/17G
17 POWDER, FOR SOLUTION ORAL DAILY
Refills: 0 | Status: DISCONTINUED | OUTPATIENT
Start: 2024-12-12 | End: 2024-12-19

## 2024-12-12 RX ORDER — SENNOSIDES 8.6 MG
2 TABLET ORAL AT BEDTIME
Refills: 0 | Status: DISCONTINUED | OUTPATIENT
Start: 2024-12-12 | End: 2024-12-19

## 2024-12-12 RX ORDER — ONDANSETRON HYDROCHLORIDE 4 MG/1
4 TABLET, FILM COATED ORAL EVERY 6 HOURS
Refills: 0 | Status: DISCONTINUED | OUTPATIENT
Start: 2024-12-12 | End: 2024-12-12

## 2024-12-12 RX ADMIN — Medication 2 TABLET(S): at 21:57

## 2024-12-12 RX ADMIN — HYDROMORPHONE HYDROCHLORIDE 30 MILLILITER(S): 2 TABLET ORAL at 20:32

## 2024-12-12 RX ADMIN — Medication 100 MILLIGRAM(S): at 21:57

## 2024-12-12 RX ADMIN — Medication 120 MILLILITER(S): at 19:13

## 2024-12-12 RX ADMIN — APREPITANT 40 MILLIGRAM(S): 40 CAPSULE ORAL at 09:30

## 2024-12-12 RX ADMIN — Medication 120 MILLILITER(S): at 20:34

## 2024-12-12 RX ADMIN — ROSUVASTATIN CALCIUM 10 MILLIGRAM(S): 5 TABLET, FILM COATED ORAL at 21:57

## 2024-12-12 RX ADMIN — ACETAMINOPHEN 500MG 1000 MILLIGRAM(S): 500 TABLET, COATED ORAL at 09:30

## 2024-12-12 NOTE — PATIENT PROFILE ADULT - FALL HARM RISK - HARM RISK INTERVENTIONS
Assistance with ambulation/Assistance OOB with selected safe patient handling equipment/Communicate Risk of Fall with Harm to all staff/Discuss with provider need for PT consult/Monitor gait and stability/Provide patient with walking aids - walker, cane, crutches/Reinforce activity limits and safety measures with patient and family/Sit up slowly, dangle for a short time, stand at bedside before walking/Tailored Fall Risk Interventions/Use of alarms - bed, chair and/or voice tab/Visual Cue: Yellow wristband and red socks/Bed in lowest position, wheels locked, appropriate side rails in place/Call bell, personal items and telephone in reach/Instruct patient to call for assistance before getting out of bed or chair/Non-slip footwear when patient is out of bed/[place] to call system/Physically safe environment - no spills, clutter or unnecessary equipment/Purposeful Proactive Rounding/Room/bathroom lighting operational, light cord in reach

## 2024-12-12 NOTE — BRIEF OPERATIVE NOTE - OPERATION/FINDINGS
T10-T12 posterior spinal fusion, T11-L1 laminectomy and decompression, revision L3-L4 posterior spinal fusion [Negative] : Heme/Lymph

## 2024-12-12 NOTE — ASU PREOP CHECKLIST - 1.
Patient alert & oriented x4, denies any contacts, jewelry, or undergarments. As per patient, all personal property/belongings left in locker. Patient denies any implants/metals/or other internal prostheses besides left knee bolts/staples, back rods.

## 2024-12-12 NOTE — PROGRESS NOTE ADULT - SUBJECTIVE AND OBJECTIVE BOX
ORTHOPAEDIC POST OP NOTE    Procedures  T10-T12 PSF  T11-L1 Laminectomy  L3-4 revision PSF    Patient seen and examined at bedside. Doing well, Pain well controlled. Denies constitutional symptoms, chest pain, SOB, n/v.    PE:  Resting comfortably  PCA pump functioning at bedside    Dressings c/d/i  2 drains on suction with minimal serosanguineous drainage  L2-S1 sensation intact and bilateral  Hip flexors  knee ext  knee flex  ankle dorsiflexion  ankle plantarflexion  feet wwp, pulses 2+    A/P: 79M s/p T10-12 PSF, T11-L1 Laminectomy, L3-4 revision PSF doing well post operatively.    - WBAT   - post op antibiotics x24 hours  monitor drain outputs per shift  - pain control - on PCA pump. no NSAIDS  - DVT: SCD, LVX to begin POD1 (12/13/24)  - diet  - incentive spirometry  - AM labs  - PT/OT ORTHOPAEDIC POST OP NOTE    Procedures  T10-T12 PSF  T11-L1 Laminectomy  L3-4 revision PSF    Patient seen and examined at bedside. Doing well, Pain well controlled. Denies constitutional symptoms, chest pain, SOB, n/v.    PE:  Resting comfortably  PCA pump functioning at bedside    Dressings c/d/i  2 drains on suction with serosanguineous drainage  L2-S1 sensation intact and bilateral  Strength R/L:  Hip flexors 5/5  knee ext 5/5  knee flex 5/5  ankle dorsiflexion 3/5  ankle plantarflexion 5/5  feet wwp    A/P: 79M s/p T10-12 PSF, T11-L1 Laminectomy, L3-4 revision PSF doing well post operatively.    - WBAT   - post op antibiotics x24 hours  monitor drain outputs per shift  - pain control - on PCA pump. no NSAIDS  - DVT: SCD, LVX to begin POD1 (12/13/24)  - diet  - incentive spirometry  - AM labs  - PT/OT

## 2024-12-12 NOTE — ASU PREOP CHECKLIST - 2.
Type & screen specimen needed as per blood bank; MD, anesthesia & OR RN made aware. Type & screen specimen obtained pre-operatively, sent to blood bank. CBC & BMP sent STAT to lab as per anesthesia; OR RN made aware.

## 2024-12-12 NOTE — CHART NOTE - NSCHARTNOTEFT_GEN_A_CORE
PACU ANESTHESIA ADMISSION NOTE      Procedure:   Post op diagnosis:      ____  Intubated  TV:______       Rate: ______      FiO2: ______    __x__  Patent Airway    __x__  Full return of protective reflexes    __x__  Full recovery from anesthesia / back to baseline status    Vitals:  T(C): 36.6 (12-12-24 @ 11:15), Max: 36.6 (12-12-24 @ 09:20)  HR: 52 (12-12-24 @ 11:15) (52 - 52)  BP: 147/66 (12-12-24 @ 11:15) (147/66 - 147/66)  RR: 16 (12-12-24 @ 11:15) (16 - 16)  SpO2: 98% (12-12-24 @ 11:15) (98% - 98%)    Mental Status:  ____ Awake   _____ Alert   __x___ Drowsy   _____ Sedated    Nausea/Vomiting:  __x__ NO  ______Yes,   See Post - Op Orders          Pain Scale (0-10):  __0___    Treatment: ____ None    __x__ See Post - Op/PCA Orders    Post - Operative Fluids:   ____ Oral   __x__ See Post - Op Orders    Plan: Discharge:   ____Home       __x___Floor     _____Critical Care    _____  Other:_________________    Comments: Patient had smooth intraoperative event, no anesthesia complication.

## 2024-12-12 NOTE — BRIEF OPERATIVE NOTE - NSICDXBRIEFPROCEDURE_GEN_ALL_CORE_FT
PROCEDURES:  Fusion of 6 or less spinal segments by posterior approach 12-Dec-2024 17:03:05  Valentine Linares

## 2024-12-13 LAB
ANION GAP SERPL CALC-SCNC: 10 MMOL/L — SIGNIFICANT CHANGE UP (ref 7–14)
BASOPHILS # BLD AUTO: 0 K/UL — SIGNIFICANT CHANGE UP (ref 0–0.2)
BASOPHILS NFR BLD AUTO: 0 % — SIGNIFICANT CHANGE UP (ref 0–1)
BUN SERPL-MCNC: 30 MG/DL — HIGH (ref 10–20)
CALCIUM SERPL-MCNC: 8.4 MG/DL — SIGNIFICANT CHANGE UP (ref 8.4–10.4)
CHLORIDE SERPL-SCNC: 104 MMOL/L — SIGNIFICANT CHANGE UP (ref 98–110)
CO2 SERPL-SCNC: 24 MMOL/L — SIGNIFICANT CHANGE UP (ref 17–32)
CREAT SERPL-MCNC: 1.5 MG/DL — SIGNIFICANT CHANGE UP (ref 0.7–1.5)
EGFR: 46 ML/MIN/1.73M2 — LOW
EOSINOPHIL # BLD AUTO: 0 K/UL — SIGNIFICANT CHANGE UP (ref 0–0.7)
EOSINOPHIL NFR BLD AUTO: 0 % — SIGNIFICANT CHANGE UP (ref 0–8)
GLUCOSE SERPL-MCNC: 140 MG/DL — HIGH (ref 70–99)
HCT VFR BLD CALC: 23.2 % — LOW (ref 42–52)
HGB BLD-MCNC: 8.1 G/DL — LOW (ref 14–18)
IMM GRANULOCYTES NFR BLD AUTO: 1.3 % — HIGH (ref 0.1–0.3)
LYMPHOCYTES # BLD AUTO: 0.72 K/UL — LOW (ref 1.2–3.4)
LYMPHOCYTES # BLD AUTO: 12.9 % — LOW (ref 20.5–51.1)
MCHC RBC-ENTMCNC: 34.9 G/DL — SIGNIFICANT CHANGE UP (ref 32–37)
MCHC RBC-ENTMCNC: 35.5 PG — HIGH (ref 27–31)
MCV RBC AUTO: 101.8 FL — HIGH (ref 80–94)
MONOCYTES # BLD AUTO: 0.52 K/UL — SIGNIFICANT CHANGE UP (ref 0.1–0.6)
MONOCYTES NFR BLD AUTO: 9.3 % — SIGNIFICANT CHANGE UP (ref 1.7–9.3)
NEUTROPHILS # BLD AUTO: 4.26 K/UL — SIGNIFICANT CHANGE UP (ref 1.4–6.5)
NEUTROPHILS NFR BLD AUTO: 76.5 % — HIGH (ref 42.2–75.2)
NRBC # BLD: 0 /100 WBCS — SIGNIFICANT CHANGE UP (ref 0–0)
PLATELET # BLD AUTO: 104 K/UL — LOW (ref 130–400)
PMV BLD: 10.9 FL — HIGH (ref 7.4–10.4)
POTASSIUM SERPL-MCNC: 5.1 MMOL/L — HIGH (ref 3.5–5)
POTASSIUM SERPL-SCNC: 5.1 MMOL/L — HIGH (ref 3.5–5)
RBC # BLD: 2.28 M/UL — LOW (ref 4.7–6.1)
RBC # FLD: 12.2 % — SIGNIFICANT CHANGE UP (ref 11.5–14.5)
SODIUM SERPL-SCNC: 138 MMOL/L — SIGNIFICANT CHANGE UP (ref 135–146)
WBC # BLD: 5.57 K/UL — SIGNIFICANT CHANGE UP (ref 4.8–10.8)
WBC # FLD AUTO: 5.57 K/UL — SIGNIFICANT CHANGE UP (ref 4.8–10.8)

## 2024-12-13 RX ORDER — 0.9 % SODIUM CHLORIDE 0.9 %
1000 INTRAVENOUS SOLUTION INTRAVENOUS
Refills: 0 | Status: DISCONTINUED | OUTPATIENT
Start: 2024-12-13 | End: 2024-12-15

## 2024-12-13 RX ORDER — TAMSULOSIN HYDROCHLORIDE 0.4 MG/1
0.4 CAPSULE ORAL AT BEDTIME
Refills: 0 | Status: DISCONTINUED | OUTPATIENT
Start: 2024-12-13 | End: 2024-12-19

## 2024-12-13 RX ADMIN — Medication 100 MILLIGRAM(S): at 14:41

## 2024-12-13 RX ADMIN — ENOXAPARIN SODIUM 40 MILLIGRAM(S): 30 INJECTION SUBCUTANEOUS at 14:41

## 2024-12-13 RX ADMIN — Medication 81 MILLIGRAM(S): at 11:44

## 2024-12-13 RX ADMIN — METOPROLOL TARTRATE 50 MILLIGRAM(S): 100 TABLET, FILM COATED ORAL at 05:37

## 2024-12-13 RX ADMIN — ROSUVASTATIN CALCIUM 10 MILLIGRAM(S): 5 TABLET, FILM COATED ORAL at 21:42

## 2024-12-13 RX ADMIN — Medication 100 MILLIGRAM(S): at 05:38

## 2024-12-13 RX ADMIN — PANTOPRAZOLE SODIUM 40 MILLIGRAM(S): 40 TABLET, DELAYED RELEASE ORAL at 05:37

## 2024-12-13 RX ADMIN — TAMSULOSIN HYDROCHLORIDE 0.4 MILLIGRAM(S): 0.4 CAPSULE ORAL at 21:42

## 2024-12-13 NOTE — PROGRESS NOTE ADULT - SUBJECTIVE AND OBJECTIVE BOX
ORTHOPAEDIC PROGRESS NOTE    Procedure on 12/12/24:  T10-T12 PSF  T11-L1 Laminectomy  L3-4 revision PSF    Patient seen and examined at bedside. Doing well, Pain well controlled. Denies constitutional symptoms, chest pain, SOB, n/v.    PE:  Resting comfortably  PCA pump functioning at bedside    Dressings c/d/i  2 drains on suction with serosanguineous drainage  L2-S1 sensation intact and bilateral  Strength R/L:  Hip flexors 5/5  knee ext 5/5  knee flex 5/5  ankle dorsiflexion 3/5  ankle plantarflexion 5/5  feet wwp    Drain output:  1 (Deep): 313 cc overnight  2 (superficial) 185 cc overnight    A/P: 79M s/p T10-12 PSF, T11-L1 Laminectomy, L3-4 revision PSF doing well.     - WBAT   - post op antibiotics x24 hours  monitor drain outputs per shift  - pain control - on PCA pump. no NSAIDS  - DVT: SCD, LVX to begin POD1 (12/13/24)  - diet  - incentive spirometry  - AM labs  - PT/OT  - TLSO brace

## 2024-12-13 NOTE — PHYSICAL THERAPY INITIAL EVALUATION ADULT - MD ORDER
Fusion of 6 or less spinal segments by posterior approach   T10-T12 posterior spinal fusion, T11-L1 laminectomy and decompression, revision L3-L4 posterior spinal fusion

## 2024-12-13 NOTE — PHYSICAL THERAPY INITIAL EVALUATION ADULT - NS ASR RISK AREAS PT EVAL
Unsafe to ambulate. Impulsive and responds with aggression when educated on safety./fall/impaired judgment/safety awareness

## 2024-12-13 NOTE — PHYSICAL THERAPY INITIAL EVALUATION ADULT - GAIT DISTANCE, PT EVAL
40'; maximum verbal cues for safety and to slow down ramirez. Unsafe to ambulate. Impulsive and responds with aggression when educated on safety.

## 2024-12-13 NOTE — PHYSICAL THERAPY INITIAL EVALUATION ADULT - PERSONAL SAFETY AND JUDGMENT, REHAB EVAL
Unsafe to ambulate. Impulsive and responds with aggression when educated on safety./at risk behaviors demonstrated

## 2024-12-13 NOTE — PHYSICAL THERAPY INITIAL EVALUATION ADULT - GENERAL OBSERVATIONS, REHAB EVAL
PT IE 11:30-12pm. Patient left in supine in NAD. +call bell, +bed alarm. Unsafe to ambulate. Impulsive and responds with aggression when educated on safety.

## 2024-12-13 NOTE — PROVIDER CONTACT NOTE (OTHER) - SITUATION
looked at pt dressing when he came up to 4C from PACU it was dry clean and intact. When awoken for vitals noticed there was some blood on dressing
pt  was  due to void at approx 2 pm - at 3 pm pt had still not voided , RN did bladder scan , scan showed 320 cc of urine

## 2024-12-13 NOTE — OCCUPATIONAL THERAPY INITIAL EVALUATION ADULT - NS ASR FOLLOW COMMAND OT EVAL
impulsive, requires frequent redirection for safety/75% of the time/able to follow single-step instructions

## 2024-12-13 NOTE — OCCUPATIONAL THERAPY INITIAL EVALUATION ADULT - GENERAL OBSERVATIONS, REHAB EVAL
Pt encountered semi reclined in bed, + IV, + PCA. Pt agreeable to bedside OT assessment, may be seen as confirmed with RN. Pt returned to bed in chair mode, + IV, + PCA Pt encountered semi reclined in bed, + IV, + PCA, + SYLVESTER drain x 2. Pt agreeable to bedside OT assessment, may be seen as confirmed with RN. Pt returned to bed in chair mode, + IV, + PCA, + SYLVESTER drain x 2. Pt fitted with TLSO at bedside, educated on donning/ doffing and wear schedule, pt verbalized fair understanding. requires assistance for donning and doffing

## 2024-12-13 NOTE — CONSULT NOTE ADULT - SUBJECTIVE AND OBJECTIVE BOX
HPI: Patient is an 79M POD1 s/p T10-T12 PSF, T11-L1 Laminectomy, L3-4 revision PSF. Pain medicine services now consulted.     Current Inpatient Medication Regimen:  acetaminophen     Tablet .. 650 milliGRAM(s) Oral every 6 hours PRN  aspirin  chewable 81 milliGRAM(s) Oral daily  ceFAZolin   IVPB 2000 milliGRAM(s) IV Intermittent every 8 hours  enoxaparin Injectable 40 milliGRAM(s) SubCutaneous every 24 hours  escitalopram 10 milliGRAM(s) Oral daily  hydrochlorothiazide 25 milliGRAM(s) Oral daily  HYDROmorphone PCA (1 mG/mL) 30 milliLiter(s) PCA Continuous PCA Continuous  lactated ringers. 1000 milliLiter(s) IV Continuous <Continuous>  melatonin 3 milliGRAM(s) Oral at bedtime PRN  metoprolol succinate ER 50 milliGRAM(s) Oral daily  naloxone Injectable 0.1 milliGRAM(s) IV Push every 3 minutes PRN  ondansetron Injectable 4 milliGRAM(s) IV Push every 8 hours PRN  pantoprazole    Tablet 40 milliGRAM(s) Oral before breakfast  polyethylene glycol 3350 17 Gram(s) Oral daily PRN  rosuvastatin 10 milliGRAM(s) Oral at bedtime  senna 2 Tablet(s) Oral at bedtime      Home Analgesic Regimen:      Allergies:  bacitracin (Rash)      Past Medical History:  Spinal stenosis of lumbar region without neurogenic claudication    Spinal stenosis of thoracic region    Lymphoma    Skin cancer    Thoracic spinal stenosis    Thoracic spinal stenosis    Fusion of 6 or less spinal segments by posterior approach    H/O toe surgery    S/P spinal surgery    H/O left knee surgery    S/P cholecystectomy    S/P right hemicolectomy      Review of Systems:  General: no fevers or chills  Eyes: no diplopia or blurred vision  ENT: no rhinorrhea  CV: no chest pain  Resp: no cough or dyspnea  GI: no abdominal pain, constipation, or diarrhea  : no urinary incontinence or dysuria  Neuro: no focal weakness or numbness    Physical Exam:  T(C): 36.4 (12-13-24 @ 08:19), Max: 36.8 (12-13-24 @ 04:47)  HR: 59 (12-13-24 @ 08:19) (50 - 68)  BP: 110/62 (12-13-24 @ 08:19) (98/51 - 121/62)  RR: 18 (12-13-24 @ 08:19) (16 - 18)  SpO2: 96% (12-13-24 @ 08:19) (96% - 100%)  Gen: NAD  Eyes: no glasses or scleral icterus  Head: Normocephalic / Atraumatic  CV: no JVD  Lungs: nonlabored breathing  Abdomen: nondistended, soft  : no esqueda catheter in place  Back: tenderness to palpation s/p T10-T12 PSF, T11-L1 Laminectomy,L3-4 revision PSF  Neuro: AOx3  Extremities: full ROM in upper/lower extremities  Psych: normal affect      Labs:  CBC  5.57 K/uL [4.80 - 10.80] > 8.1 g/dL[L] [14.0 - 18.0] / 23.2 %[L] [42.0 - 52.0] < 104 K/uL[L] [130 - 400]      BMP  138 mmol/L [135 - 146] | 104 mmol/L [98 - 110] | 30 mg/dL[H] [10 - 20]  5.1 mmol/L[H] [3.5 - 5.0] | 24 mmol/L [17 - 32] | 1.5 mg/dL [0.7 - 1.5]    140 mg/dL[H] [70 - 99]  CBC  5.27 K/uL [4.80 - 10.80] > 11.0 g/dL[L] [14.0 - 18.0] / 31.9 %[L] [42.0 - 52.0] < 125 K/uL[L] [130 - 400]

## 2024-12-13 NOTE — CONSULT NOTE ADULT - ASSESSMENT
A/P: Patient is an 79M POD1 s/p T10-T12 PSF, T11-L1 Laminectomy, L3-4 revision PSF.     Pain medicine services now consulted. As per patient intolerance to oxycodone in the past - endorses vomiting. Patient educated and agreeable on transitioning off PCA and using oral medications. Physical therapy at bedside.     PLAN  #Acute post operative pain   #T10-T12 PSF, T11-L1 Laminectomy, L3-4 revision PSF  - Please transition off PCA pump and start PO analgesic regimen  - Acetaminophen 650mg q6h scheduled  - d/c PCA, start PO Hydromorphone 2mg q4h PRN for severe pain - Start Methocarbamol 500mg q8h scheduled  - Encourage Physical therapy/ambulation/oob to chair    #Opioid induced constipation  - Bowel regimen as per primary team

## 2024-12-13 NOTE — OCCUPATIONAL THERAPY INITIAL EVALUATION ADULT - TRANSFER TRAINING, PT EVAL
Pt will perform sit<>stand with min A and bed<>chair transfers with mod A using most appropriate AD by d/c

## 2024-12-13 NOTE — OCCUPATIONAL THERAPY INITIAL EVALUATION ADULT - ADDITIONAL COMMENTS
Pt endorses residing in private house with spouse, + 2 YURI and 1 FOS to bedroom/ bathroom; stall shower, + driving, + walking stick

## 2024-12-14 LAB
ANION GAP SERPL CALC-SCNC: 16 MMOL/L — HIGH (ref 7–14)
BUN SERPL-MCNC: 29 MG/DL — HIGH (ref 10–20)
CALCIUM SERPL-MCNC: 8.6 MG/DL — SIGNIFICANT CHANGE UP (ref 8.4–10.5)
CHLORIDE SERPL-SCNC: 99 MMOL/L — SIGNIFICANT CHANGE UP (ref 98–110)
CO2 SERPL-SCNC: 21 MMOL/L — SIGNIFICANT CHANGE UP (ref 17–32)
CREAT SERPL-MCNC: 1.5 MG/DL — SIGNIFICANT CHANGE UP (ref 0.7–1.5)
EGFR: 46 ML/MIN/1.73M2 — LOW
GLUCOSE BLDC GLUCOMTR-MCNC: 152 MG/DL — HIGH (ref 70–99)
GLUCOSE SERPL-MCNC: 136 MG/DL — HIGH (ref 70–99)
HCT VFR BLD CALC: 27.3 % — LOW (ref 42–52)
HGB BLD-MCNC: 9.1 G/DL — LOW (ref 14–18)
MCHC RBC-ENTMCNC: 31.6 PG — HIGH (ref 27–31)
MCHC RBC-ENTMCNC: 33.3 G/DL — SIGNIFICANT CHANGE UP (ref 32–37)
MCV RBC AUTO: 94.8 FL — HIGH (ref 80–94)
NRBC # BLD: 0 /100 WBCS — SIGNIFICANT CHANGE UP (ref 0–0)
PLATELET # BLD AUTO: 121 K/UL — LOW (ref 130–400)
PMV BLD: 11 FL — HIGH (ref 7.4–10.4)
POTASSIUM SERPL-MCNC: 3.9 MMOL/L — SIGNIFICANT CHANGE UP (ref 3.5–5)
POTASSIUM SERPL-SCNC: 3.9 MMOL/L — SIGNIFICANT CHANGE UP (ref 3.5–5)
RBC # BLD: 2.88 M/UL — LOW (ref 4.7–6.1)
RBC # FLD: 12.4 % — SIGNIFICANT CHANGE UP (ref 11.5–14.5)
SODIUM SERPL-SCNC: 136 MMOL/L — SIGNIFICANT CHANGE UP (ref 135–146)
WBC # BLD: 10.01 K/UL — SIGNIFICANT CHANGE UP (ref 4.8–10.8)
WBC # FLD AUTO: 10.01 K/UL — SIGNIFICANT CHANGE UP (ref 4.8–10.8)

## 2024-12-14 RX ORDER — ACETAMINOPHEN 500MG 500 MG/1
650 TABLET, COATED ORAL EVERY 6 HOURS
Refills: 0 | Status: DISCONTINUED | OUTPATIENT
Start: 2024-12-14 | End: 2024-12-19

## 2024-12-14 RX ORDER — METHOCARBAMOL 500 MG/1
500 TABLET, FILM COATED ORAL EVERY 8 HOURS
Refills: 0 | Status: DISCONTINUED | OUTPATIENT
Start: 2024-12-14 | End: 2024-12-19

## 2024-12-14 RX ORDER — HYDROMORPHONE HYDROCHLORIDE 2 MG/1
2 TABLET ORAL EVERY 4 HOURS
Refills: 0 | Status: DISCONTINUED | OUTPATIENT
Start: 2024-12-14 | End: 2024-12-19

## 2024-12-14 RX ADMIN — ESCITALOPRAM OXALATE 10 MILLIGRAM(S): 10 TABLET, FILM COATED ORAL at 11:09

## 2024-12-14 RX ADMIN — PANTOPRAZOLE SODIUM 40 MILLIGRAM(S): 40 TABLET, DELAYED RELEASE ORAL at 05:27

## 2024-12-14 RX ADMIN — Medication 2 TABLET(S): at 21:25

## 2024-12-14 RX ADMIN — ENOXAPARIN SODIUM 40 MILLIGRAM(S): 30 INJECTION SUBCUTANEOUS at 14:14

## 2024-12-14 RX ADMIN — METHOCARBAMOL 500 MILLIGRAM(S): 500 TABLET, FILM COATED ORAL at 21:26

## 2024-12-14 RX ADMIN — ACETAMINOPHEN 500MG 650 MILLIGRAM(S): 500 TABLET, COATED ORAL at 18:31

## 2024-12-14 RX ADMIN — ROSUVASTATIN CALCIUM 10 MILLIGRAM(S): 5 TABLET, FILM COATED ORAL at 21:26

## 2024-12-14 RX ADMIN — METOPROLOL TARTRATE 50 MILLIGRAM(S): 100 TABLET, FILM COATED ORAL at 05:27

## 2024-12-14 RX ADMIN — ACETAMINOPHEN 500MG 650 MILLIGRAM(S): 500 TABLET, COATED ORAL at 18:02

## 2024-12-14 RX ADMIN — TAMSULOSIN HYDROCHLORIDE 0.4 MILLIGRAM(S): 0.4 CAPSULE ORAL at 21:25

## 2024-12-14 RX ADMIN — HYDROMORPHONE HYDROCHLORIDE 2 MILLIGRAM(S): 2 TABLET ORAL at 21:24

## 2024-12-14 RX ADMIN — Medication 81 MILLIGRAM(S): at 11:09

## 2024-12-14 NOTE — PROVIDER CONTACT NOTE (OTHER) - REASON
pt  has not voided yet
pt unable to urinate - bladder scan now reads  500 cc
soiled dressing
unable to void on own

## 2024-12-14 NOTE — PROGRESS NOTE ADULT - SUBJECTIVE AND OBJECTIVE BOX
ORTHOPAEDIC SURGERY PROGRESS NOTE    Interval History:  Patient seen and examined at bedside.    MEDICATIONS  (STANDING):  aspirin  chewable 81 milliGRAM(s) Oral daily  enoxaparin Injectable 40 milliGRAM(s) SubCutaneous every 24 hours  escitalopram 10 milliGRAM(s) Oral daily  hydrochlorothiazide 25 milliGRAM(s) Oral daily  HYDROmorphone PCA (1 mG/mL) 30 milliLiter(s) PCA Continuous PCA Continuous  lactated ringers. 1000 milliLiter(s) (50 mL/Hr) IV Continuous <Continuous>  metoprolol succinate ER 50 milliGRAM(s) Oral daily  pantoprazole    Tablet 40 milliGRAM(s) Oral before breakfast  rosuvastatin 10 milliGRAM(s) Oral at bedtime  senna 2 Tablet(s) Oral at bedtime  tamsulosin 0.4 milliGRAM(s) Oral at bedtime    MEDICATIONS  (PRN):  acetaminophen     Tablet .. 650 milliGRAM(s) Oral every 6 hours PRN Temp greater or equal to 38C (100.4F), Mild Pain (1 - 3)  melatonin 3 milliGRAM(s) Oral at bedtime PRN Insomnia  naloxone Injectable 0.1 milliGRAM(s) IV Push every 3 minutes PRN For ANY of the following changes in patient status:  A. RR LESS THAN 10 breaths per minute, B. Oxygen saturation LESS THAN 90%, C. Sedation score of 6  ondansetron Injectable 4 milliGRAM(s) IV Push every 8 hours PRN Nausea and/or Vomiting  polyethylene glycol 3350 17 Gram(s) Oral daily PRN Constipation      Vital Signs Last 24 Hrs  T(C): 37.3 (14 Dec 2024 04:48), Max: 37.9 (14 Dec 2024 00:51)  T(F): 99.2 (14 Dec 2024 04:48), Max: 100.2 (14 Dec 2024 00:51)  HR: 85 (14 Dec 2024 04:48) (59 - 85)  BP: 112/59 (14 Dec 2024 04:48) (110/62 - 136/70)  BP(mean): --  RR: 18 (14 Dec 2024 04:48) (18 - 18)  SpO2: 99% (14 Dec 2024 04:48) (95% - 99%)    Physical Exam:   PE:  Resting comfortably  PCA pump functioning at bedside    Dressings c/d/i  2 drains on suction with serosanguineous drainage  L2-S1 sensation intact and bilateral  Strength R/L:  Hip flexors 5/5  knee ext 5/5  knee flex 5/5  ankle dorsiflexion 3/5  ankle plantarflexion 5/5  feet wwp    Drain output:  1 (Deep): 170 mL 24hr  2 (superficial) 95mL 24hr                           8.1    5.57  )-----------( 104      ( 13 Dec 2024 04:56 )             23.2     12-13    138  |  104  |  30[H]  ----------------------------<  140[H]  5.1[H]   |  24  |  1.5    Ca    8.4      13 Dec 2024 04:56      A/P: 79M s/p T10-12 PSF, T11-L1 Laminectomy, L3-4 revision PSF doing well.     Failed TOV x 2 yesterday, Boateng replaced, remove once patient is ambulating regularly.    - WBAT   - post op antibiotics x24 hours done  monitor drain outputs per shift  - pain control - on PCA pump. no NSAIDS  - DVT: SCD, LVX  - diet  - incentive spirometry  - AM labs  - PT/OT  - TLSO brace ORTHOPAEDIC SURGERY PROGRESS NOTE    Interval History:  Patient seen and examined at bedside.    MEDICATIONS  (STANDING):  aspirin  chewable 81 milliGRAM(s) Oral daily  enoxaparin Injectable 40 milliGRAM(s) SubCutaneous every 24 hours  escitalopram 10 milliGRAM(s) Oral daily  hydrochlorothiazide 25 milliGRAM(s) Oral daily  HYDROmorphone PCA (1 mG/mL) 30 milliLiter(s) PCA Continuous PCA Continuous  lactated ringers. 1000 milliLiter(s) (50 mL/Hr) IV Continuous <Continuous>  metoprolol succinate ER 50 milliGRAM(s) Oral daily  pantoprazole    Tablet 40 milliGRAM(s) Oral before breakfast  rosuvastatin 10 milliGRAM(s) Oral at bedtime  senna 2 Tablet(s) Oral at bedtime  tamsulosin 0.4 milliGRAM(s) Oral at bedtime    MEDICATIONS  (PRN):  acetaminophen     Tablet .. 650 milliGRAM(s) Oral every 6 hours PRN Temp greater or equal to 38C (100.4F), Mild Pain (1 - 3)  melatonin 3 milliGRAM(s) Oral at bedtime PRN Insomnia  naloxone Injectable 0.1 milliGRAM(s) IV Push every 3 minutes PRN For ANY of the following changes in patient status:  A. RR LESS THAN 10 breaths per minute, B. Oxygen saturation LESS THAN 90%, C. Sedation score of 6  ondansetron Injectable 4 milliGRAM(s) IV Push every 8 hours PRN Nausea and/or Vomiting  polyethylene glycol 3350 17 Gram(s) Oral daily PRN Constipation      Vital Signs Last 24 Hrs  T(C): 37.3 (14 Dec 2024 04:48), Max: 37.9 (14 Dec 2024 00:51)  T(F): 99.2 (14 Dec 2024 04:48), Max: 100.2 (14 Dec 2024 00:51)  HR: 85 (14 Dec 2024 04:48) (59 - 85)  BP: 112/59 (14 Dec 2024 04:48) (110/62 - 136/70)  BP(mean): --  RR: 18 (14 Dec 2024 04:48) (18 - 18)  SpO2: 99% (14 Dec 2024 04:48) (95% - 99%)    Physical Exam:   PE:  Resting comfortably  PCA pump functioning at bedside    Dressings c/d/i  2 drains on suction with serosanguineous drainage  L2-S1 sensation intact and bilateral  Strength R/L:  Hip flexors 5/5  knee ext 5/5  knee flex 5/5  ankle dorsiflexion 3/5 on the R (patient has baseline R foot drop)  ankle plantarflexion 5/5  feet wwp    Drain output:  1 (Deep): 170 mL 24hr  2 (superficial) 95mL 24hr                           8.1    5.57  )-----------( 104      ( 13 Dec 2024 04:56 )             23.2     12-13    138  |  104  |  30[H]  ----------------------------<  140[H]  5.1[H]   |  24  |  1.5    Ca    8.4      13 Dec 2024 04:56      A/P: 79M s/p T10-12 PSF, T11-L1 Laminectomy, L3-4 revision PSF doing well.     Failed TOV x 2 yesterday, Boateng replaced, remove once patient is ambulating regularly.    - WBAT   - post op antibiotics x24 hours done  monitor drain outputs per shift  - pain control - on PCA pump. no NSAIDS  - DVT: SCD, LVX  - diet  - incentive spirometry  - AM labs  - PT/OT  - TLSO brace

## 2024-12-15 LAB
HCT VFR BLD CALC: 24.7 % — LOW (ref 42–52)
HGB BLD-MCNC: 8.5 G/DL — LOW (ref 14–18)
MCHC RBC-ENTMCNC: 32.2 PG — HIGH (ref 27–31)
MCHC RBC-ENTMCNC: 34.4 G/DL — SIGNIFICANT CHANGE UP (ref 32–37)
MCV RBC AUTO: 93.6 FL — SIGNIFICANT CHANGE UP (ref 80–94)
NRBC # BLD: 0 /100 WBCS — SIGNIFICANT CHANGE UP (ref 0–0)
PLATELET # BLD AUTO: 107 K/UL — LOW (ref 130–400)
PMV BLD: 11.2 FL — HIGH (ref 7.4–10.4)
RBC # BLD: 2.64 M/UL — LOW (ref 4.7–6.1)
RBC # FLD: 12.1 % — SIGNIFICANT CHANGE UP (ref 11.5–14.5)
WBC # BLD: 9.03 K/UL — SIGNIFICANT CHANGE UP (ref 4.8–10.8)
WBC # FLD AUTO: 9.03 K/UL — SIGNIFICANT CHANGE UP (ref 4.8–10.8)

## 2024-12-15 RX ADMIN — METHOCARBAMOL 500 MILLIGRAM(S): 500 TABLET, FILM COATED ORAL at 06:17

## 2024-12-15 RX ADMIN — TAMSULOSIN HYDROCHLORIDE 0.4 MILLIGRAM(S): 0.4 CAPSULE ORAL at 22:03

## 2024-12-15 RX ADMIN — ACETAMINOPHEN 500MG 650 MILLIGRAM(S): 500 TABLET, COATED ORAL at 11:24

## 2024-12-15 RX ADMIN — METHOCARBAMOL 500 MILLIGRAM(S): 500 TABLET, FILM COATED ORAL at 14:24

## 2024-12-15 RX ADMIN — METOPROLOL TARTRATE 50 MILLIGRAM(S): 100 TABLET, FILM COATED ORAL at 06:16

## 2024-12-15 RX ADMIN — METHOCARBAMOL 500 MILLIGRAM(S): 500 TABLET, FILM COATED ORAL at 22:01

## 2024-12-15 RX ADMIN — PANTOPRAZOLE SODIUM 40 MILLIGRAM(S): 40 TABLET, DELAYED RELEASE ORAL at 06:16

## 2024-12-15 RX ADMIN — ESCITALOPRAM OXALATE 10 MILLIGRAM(S): 10 TABLET, FILM COATED ORAL at 11:24

## 2024-12-15 RX ADMIN — Medication 2 TABLET(S): at 22:01

## 2024-12-15 RX ADMIN — ROSUVASTATIN CALCIUM 10 MILLIGRAM(S): 5 TABLET, FILM COATED ORAL at 22:02

## 2024-12-15 RX ADMIN — ACETAMINOPHEN 500MG 650 MILLIGRAM(S): 500 TABLET, COATED ORAL at 11:54

## 2024-12-15 RX ADMIN — ACETAMINOPHEN 500MG 650 MILLIGRAM(S): 500 TABLET, COATED ORAL at 06:16

## 2024-12-15 RX ADMIN — Medication 81 MILLIGRAM(S): at 11:25

## 2024-12-15 RX ADMIN — ENOXAPARIN SODIUM 40 MILLIGRAM(S): 30 INJECTION SUBCUTANEOUS at 14:23

## 2024-12-15 RX ADMIN — ACETAMINOPHEN 500MG 650 MILLIGRAM(S): 500 TABLET, COATED ORAL at 17:13

## 2024-12-15 NOTE — PROGRESS NOTE ADULT - SUBJECTIVE AND OBJECTIVE BOX
ORTHOPAEDIC SURGERY PROGRESS NOTE    Interval History:  Patient seen and examined at bedside.  Pain is controlled.  No complaints of chest pain, SOB, N/V.    MEDICATIONS  (STANDING):  acetaminophen     Tablet .. 650 milliGRAM(s) Oral every 6 hours  aspirin  chewable 81 milliGRAM(s) Oral daily  enoxaparin Injectable 40 milliGRAM(s) SubCutaneous every 24 hours  escitalopram 10 milliGRAM(s) Oral daily  hydrochlorothiazide 25 milliGRAM(s) Oral daily  lactated ringers. 1000 milliLiter(s) (50 mL/Hr) IV Continuous <Continuous>  methocarbamol 500 milliGRAM(s) Oral every 8 hours  metoprolol succinate ER 50 milliGRAM(s) Oral daily  pantoprazole    Tablet 40 milliGRAM(s) Oral before breakfast  rosuvastatin 10 milliGRAM(s) Oral at bedtime  senna 2 Tablet(s) Oral at bedtime  tamsulosin 0.4 milliGRAM(s) Oral at bedtime    MEDICATIONS  (PRN):  HYDROmorphone   Tablet 2 milliGRAM(s) Oral every 4 hours PRN Severe Pain (7 - 10)  melatonin 3 milliGRAM(s) Oral at bedtime PRN Insomnia  naloxone Injectable 0.1 milliGRAM(s) IV Push every 3 minutes PRN For ANY of the following changes in patient status:  A. RR LESS THAN 10 breaths per minute, B. Oxygen saturation LESS THAN 90%, C. Sedation score of 6  ondansetron Injectable 4 milliGRAM(s) IV Push every 8 hours PRN Nausea and/or Vomiting  polyethylene glycol 3350 17 Gram(s) Oral daily PRN Constipation      Vital Signs Last 24 Hrs  T(C): 36.7 (15 Dec 2024 05:35), Max: 37.8 (14 Dec 2024 16:06)  T(F): 98.1 (15 Dec 2024 05:35), Max: 100 (14 Dec 2024 16:06)  HR: 75 (15 Dec 2024 05:35) (73 - 88)  BP: 138/73 (15 Dec 2024 05:35) (99/62 - 151/73)  BP(mean): --  RR: 18 (15 Dec 2024 05:35) (18 - 18)  SpO2: 94% (15 Dec 2024 05:35) (94% - 99%)    Physical Exam:   Dressing C/D/I  Compartments soft and compressible  Motor intact distally  SILT distally  CR<2sec, palpable pulses                           9.1    10.01 )-----------( 121      ( 14 Dec 2024 07:48 )             27.3     12-14    136  |  99  |  29[H]  ----------------------------<  136[H]  3.9   |  21  |  1.5    Ca    8.6      14 Dec 2024 07:48          A/P: 79yMale S/P T10-T12 PSF, T11-L1 LAMI, REVISION L3-L4 PSF 12/12. Doing well.     - OOB to Chair   - WBAT  - PT/OT  - Pain control   - Extremity icing/elevation  - Incentive Spirometry   - DVT Prophylaxis   - Discharge planning    esqueda out this morning. follow up trial of void  follow up drain outputs

## 2024-12-16 RX ADMIN — METHOCARBAMOL 500 MILLIGRAM(S): 500 TABLET, FILM COATED ORAL at 21:32

## 2024-12-16 RX ADMIN — METHOCARBAMOL 500 MILLIGRAM(S): 500 TABLET, FILM COATED ORAL at 13:18

## 2024-12-16 RX ADMIN — ACETAMINOPHEN 500MG 650 MILLIGRAM(S): 500 TABLET, COATED ORAL at 17:10

## 2024-12-16 RX ADMIN — PANTOPRAZOLE SODIUM 40 MILLIGRAM(S): 40 TABLET, DELAYED RELEASE ORAL at 05:20

## 2024-12-16 RX ADMIN — METOPROLOL TARTRATE 50 MILLIGRAM(S): 100 TABLET, FILM COATED ORAL at 05:18

## 2024-12-16 RX ADMIN — POLYETHYLENE GLYCOL 3350 17 GRAM(S): 17 POWDER, FOR SOLUTION ORAL at 11:36

## 2024-12-16 RX ADMIN — TAMSULOSIN HYDROCHLORIDE 0.4 MILLIGRAM(S): 0.4 CAPSULE ORAL at 21:32

## 2024-12-16 RX ADMIN — ESCITALOPRAM OXALATE 10 MILLIGRAM(S): 10 TABLET, FILM COATED ORAL at 11:35

## 2024-12-16 RX ADMIN — ACETAMINOPHEN 500MG 650 MILLIGRAM(S): 500 TABLET, COATED ORAL at 05:20

## 2024-12-16 RX ADMIN — Medication 2 TABLET(S): at 21:32

## 2024-12-16 RX ADMIN — ROSUVASTATIN CALCIUM 10 MILLIGRAM(S): 5 TABLET, FILM COATED ORAL at 21:32

## 2024-12-16 RX ADMIN — ENOXAPARIN SODIUM 40 MILLIGRAM(S): 30 INJECTION SUBCUTANEOUS at 14:59

## 2024-12-16 RX ADMIN — Medication 81 MILLIGRAM(S): at 11:36

## 2024-12-16 RX ADMIN — METHOCARBAMOL 500 MILLIGRAM(S): 500 TABLET, FILM COATED ORAL at 05:20

## 2024-12-16 RX ADMIN — ACETAMINOPHEN 500MG 650 MILLIGRAM(S): 500 TABLET, COATED ORAL at 11:36

## 2024-12-16 NOTE — PROGRESS NOTE ADULT - SUBJECTIVE AND OBJECTIVE BOX
ORTHOPAEDIC SURGERY PROGRESS NOTE    Interval History:  Patient seen and examined at bedside.  Pain is controlled.  No complaints of chest pain, SOB, N/V.      Vital Signs Last 24 Hrs  T(C): 36.8 (16 Dec 2024 08:17), Max: 36.9 (15 Dec 2024 23:23)  T(F): 98.3 (16 Dec 2024 08:17), Max: 98.4 (15 Dec 2024 23:23)  HR: 83 (16 Dec 2024 08:17) (62 - 83)  BP: 149/72 (16 Dec 2024 08:17) (90/49 - 149/72)  BP(mean): --  RR: 18 (16 Dec 2024 08:17) (18 - 18)  SpO2: 95% (16 Dec 2024 08:17) (95% - 100%)    Parameters below as of 15 Dec 2024 08:34  Patient On (Oxygen Delivery Method): room air        Physical Exam:   Dressing C/D/I  Compartments soft and compressible  Motor intact distally  SILT distally  CR<2sec, palpable pulses                            8.5    9.03  )-----------( 107      ( 15 Dec 2024 05:18 )             24.7               A/P: 79yMale S/P T10-T12 PSF, T11-L1 LAMI, REVISION L3-L4 PSF 12/12. Doing well.     - OOB to Chair   - WBAT  - PT/OT  - Pain control   - Extremity icing/elevation  - Incentive Spirometry   - DVT Prophylaxis   - Discharge planning    esqueda out this morning. follow up trial of void- voided 850mL overnight   follow up drain outputs: depp 100 overnight, superficial 5 overnight

## 2024-12-16 NOTE — CHART NOTE - NSCHARTNOTEFT_GEN_A_CORE
Patients drains removed today, deep and superifical  incision dressings removed- sutures in place, c/d/i   incision and drain holes re-dressed    pt had small BM today but still complaining of abdominal pressure, he feels like he has to urinate but is unable to  bladder scan done bedside >800mL  esqueda order placed   discussed with patient and wife bedside will need follow up outpatient with urology next week if discharged tomorrow   currently pending auth for rehab  discussed with

## 2024-12-17 LAB — GLUCOSE BLDC GLUCOMTR-MCNC: 125 MG/DL — HIGH (ref 70–99)

## 2024-12-17 RX ORDER — LACTULOSE 10 G/15ML
15 SOLUTION ORAL
Refills: 0 | Status: DISCONTINUED | OUTPATIENT
Start: 2024-12-17 | End: 2024-12-17

## 2024-12-17 RX ORDER — LACTULOSE 10 G/15ML
15 SOLUTION ORAL
Refills: 0 | Status: DISCONTINUED | OUTPATIENT
Start: 2024-12-17 | End: 2024-12-19

## 2024-12-17 RX ORDER — CHLORHEXIDINE GLUCONATE 1.2 MG/ML
1 RINSE ORAL
Refills: 0 | Status: DISCONTINUED | OUTPATIENT
Start: 2024-12-17 | End: 2024-12-19

## 2024-12-17 RX ADMIN — ACETAMINOPHEN 500MG 650 MILLIGRAM(S): 500 TABLET, COATED ORAL at 05:15

## 2024-12-17 RX ADMIN — ESCITALOPRAM OXALATE 10 MILLIGRAM(S): 10 TABLET, FILM COATED ORAL at 11:24

## 2024-12-17 RX ADMIN — ENOXAPARIN SODIUM 40 MILLIGRAM(S): 30 INJECTION SUBCUTANEOUS at 17:14

## 2024-12-17 RX ADMIN — ACETAMINOPHEN 500MG 650 MILLIGRAM(S): 500 TABLET, COATED ORAL at 05:45

## 2024-12-17 RX ADMIN — PANTOPRAZOLE SODIUM 40 MILLIGRAM(S): 40 TABLET, DELAYED RELEASE ORAL at 05:15

## 2024-12-17 RX ADMIN — METOPROLOL TARTRATE 50 MILLIGRAM(S): 100 TABLET, FILM COATED ORAL at 05:15

## 2024-12-17 RX ADMIN — ACETAMINOPHEN 500MG 650 MILLIGRAM(S): 500 TABLET, COATED ORAL at 17:14

## 2024-12-17 RX ADMIN — ACETAMINOPHEN 500MG 650 MILLIGRAM(S): 500 TABLET, COATED ORAL at 11:24

## 2024-12-17 RX ADMIN — METHOCARBAMOL 500 MILLIGRAM(S): 500 TABLET, FILM COATED ORAL at 05:15

## 2024-12-17 RX ADMIN — TAMSULOSIN HYDROCHLORIDE 0.4 MILLIGRAM(S): 0.4 CAPSULE ORAL at 21:38

## 2024-12-17 RX ADMIN — CHLORHEXIDINE GLUCONATE 1 APPLICATION(S): 1.2 RINSE ORAL at 11:24

## 2024-12-17 RX ADMIN — METHOCARBAMOL 500 MILLIGRAM(S): 500 TABLET, FILM COATED ORAL at 21:38

## 2024-12-17 RX ADMIN — Medication 81 MILLIGRAM(S): at 11:24

## 2024-12-17 RX ADMIN — METHOCARBAMOL 500 MILLIGRAM(S): 500 TABLET, FILM COATED ORAL at 13:11

## 2024-12-17 RX ADMIN — Medication 2 TABLET(S): at 21:38

## 2024-12-17 RX ADMIN — LACTULOSE 15 GRAM(S): 10 SOLUTION ORAL at 21:38

## 2024-12-17 RX ADMIN — ROSUVASTATIN CALCIUM 10 MILLIGRAM(S): 5 TABLET, FILM COATED ORAL at 21:38

## 2024-12-17 NOTE — PROGRESS NOTE ADULT - ASSESSMENT
pt seen at bedside  doing well no events overnight, has esqueda for failed void x 2     Vital Signs Last 24 Hrs  T(C): 36.6 (17 Dec 2024 08:15), Max: 36.8 (16 Dec 2024 15:56)  T(F): 97.8 (17 Dec 2024 08:15), Max: 98.2 (16 Dec 2024 15:56)  HR: 76 (17 Dec 2024 08:15) (74 - 87)  BP: 150/73 (17 Dec 2024 08:15) (104/65 - 150/73)  BP(mean): 99 (17 Dec 2024 08:15) (76 - 99)  RR: 18 (17 Dec 2024 08:15) (18 - 18)  SpO2: 97% (17 Dec 2024 08:15) (96% - 98%)    dressing is clean and dry   nvid         A/P: 79yMale S/P T10-T12 PSF, T11-L1 LAMI, REVISION L3-L4 PSF 12/12. Doing well.     - OOB to Chair   - WBAT  - PT/OT  - Pain control   - Extremity icing/elevation  - Incentive Spirometry   - DVT Prophylaxis   - Discharge planning

## 2024-12-18 PROBLEM — C85.90 NON-HODGKIN LYMPHOMA, UNSPECIFIED, UNSPECIFIED SITE: Chronic | Status: ACTIVE | Noted: 2024-12-12

## 2024-12-18 PROBLEM — C44.90 UNSPECIFIED MALIGNANT NEOPLASM OF SKIN, UNSPECIFIED: Chronic | Status: ACTIVE | Noted: 2024-12-12

## 2024-12-18 RX ORDER — LACTULOSE 10 G/15ML
15 SOLUTION ORAL ONCE
Refills: 0 | Status: COMPLETED | OUTPATIENT
Start: 2024-12-18 | End: 2024-12-18

## 2024-12-18 RX ORDER — LACTULOSE 10 G/15ML
15 SOLUTION ORAL
Refills: 0 | Status: DISCONTINUED | OUTPATIENT
Start: 2024-12-18 | End: 2024-12-19

## 2024-12-18 RX ADMIN — METHOCARBAMOL 500 MILLIGRAM(S): 500 TABLET, FILM COATED ORAL at 22:13

## 2024-12-18 RX ADMIN — PANTOPRAZOLE SODIUM 40 MILLIGRAM(S): 40 TABLET, DELAYED RELEASE ORAL at 05:50

## 2024-12-18 RX ADMIN — ACETAMINOPHEN 500MG 650 MILLIGRAM(S): 500 TABLET, COATED ORAL at 11:54

## 2024-12-18 RX ADMIN — ACETAMINOPHEN 500MG 650 MILLIGRAM(S): 500 TABLET, COATED ORAL at 12:24

## 2024-12-18 RX ADMIN — ROSUVASTATIN CALCIUM 10 MILLIGRAM(S): 5 TABLET, FILM COATED ORAL at 22:12

## 2024-12-18 RX ADMIN — ESCITALOPRAM OXALATE 10 MILLIGRAM(S): 10 TABLET, FILM COATED ORAL at 11:54

## 2024-12-18 RX ADMIN — TAMSULOSIN HYDROCHLORIDE 0.4 MILLIGRAM(S): 0.4 CAPSULE ORAL at 22:13

## 2024-12-18 RX ADMIN — ENOXAPARIN SODIUM 40 MILLIGRAM(S): 30 INJECTION SUBCUTANEOUS at 14:14

## 2024-12-18 RX ADMIN — ACETAMINOPHEN 500MG 650 MILLIGRAM(S): 500 TABLET, COATED ORAL at 17:56

## 2024-12-18 RX ADMIN — CHLORHEXIDINE GLUCONATE 1 APPLICATION(S): 1.2 RINSE ORAL at 05:54

## 2024-12-18 RX ADMIN — METHOCARBAMOL 500 MILLIGRAM(S): 500 TABLET, FILM COATED ORAL at 14:14

## 2024-12-18 RX ADMIN — METHOCARBAMOL 500 MILLIGRAM(S): 500 TABLET, FILM COATED ORAL at 05:50

## 2024-12-18 RX ADMIN — LACTULOSE 15 GRAM(S): 10 SOLUTION ORAL at 09:00

## 2024-12-18 RX ADMIN — METOPROLOL TARTRATE 50 MILLIGRAM(S): 100 TABLET, FILM COATED ORAL at 05:50

## 2024-12-18 RX ADMIN — Medication 81 MILLIGRAM(S): at 11:54

## 2024-12-18 RX ADMIN — ACETAMINOPHEN 500MG 650 MILLIGRAM(S): 500 TABLET, COATED ORAL at 05:50

## 2024-12-18 RX ADMIN — ACETAMINOPHEN 500MG 650 MILLIGRAM(S): 500 TABLET, COATED ORAL at 06:20

## 2024-12-18 NOTE — PROGRESS NOTE ADULT - ASSESSMENT
pt seen at bedside. Resting comfortably.   doing well no events overnight    PE:  resting comfortably  NAD    Dressing C/D/I  Compartments soft and compressible  Motor intact distally  SILT distally  CR<2sec, palpable pulses        A/P: 79yMale S/P T10-T12 PSF, T11-L1 LAMI, REVISION L3-L4 PSF 12/12. Doing well.     - plan to d/c with esqueda + flomax  w/ urology follow up  - lactulose syrup until patient has a BM  - drains removed 12/16  - OOB to Chair   - WBAT  - PT/OT  - Pain control   - Extremity icing/elevation  - Incentive Spirometry   - DVT Prophylaxis   - Discharge planning -pending discharge to rehab  pt seen at bedside. Resting comfortably.   doing well no events overnight    PE:  resting comfortably  NAD    Dressing C/D/I  Compartments soft and compressible  Motor intact distally  SILT distally  CR<2sec, palpable pulses        A/P: 79yMale S/P T10-T12 PSF, T11-L1 LAMI, REVISION L3-L4 PSF 12/12. Doing well.     - plan to d/c with esqueda + flomax  w/ urology follow up  - lactulose syrup until patient has a BM  - drains removed 12/16  - OOB to Chair   - WBAT  - PT/OT  - Pain control   - Extremity icing/elevation  - Incentive Spirometry   - DVT Prophylaxis   - Discharge planning -pending discharge to rehab       Discussed with urology team- Ok for patient to be d/c with esqueda; esqueda can remain for up to 1 month. Patient should have urology follow up within the next two weeks.   Discussed with patient and patients wife bedside this information; the patient is going to facility in [place] so would like to follow up  pt seen at bedside. Resting comfortably.   doing well no events overnight    PE:  resting comfortably  NAD    Dressing C/D/I  Compartments soft and compressible  Motor intact distally  SILT distally  CR<2sec, palpable pulses        A/P: 79yMale S/P T10-T12 PSF, T11-L1 LAMI, REVISION L3-L4 PSF 12/12. Doing well.     - plan to d/c with esqueda + flomax  w/ urology follow up  - lactulose syrup until patient has a BM  - drains removed 12/16  - OOB to Chair   - WBAT  - PT/OT  - Pain control   - Extremity icing/elevation  - Incentive Spirometry   - DVT Prophylaxis   - Discharge planning -pending discharge to rehab       Discussed with urology team- Ok for patient to be d/c with esqueda; esqueda can remain for up to 1 month. Patient should have urology follow up within the next two weeks.   Discussed with patient and patients wife bedside this information; the patient is going to facility in [place] so would like to follow up with a urologist closer to their home. Offered outpatient follow up with urologist from the hospital; patient declined.

## 2024-12-19 ENCOUNTER — TRANSCRIPTION ENCOUNTER (OUTPATIENT)
Age: 79
End: 2024-12-19

## 2024-12-19 VITALS
RESPIRATION RATE: 18 BRPM | TEMPERATURE: 98 F | DIASTOLIC BLOOD PRESSURE: 65 MMHG | OXYGEN SATURATION: 97 % | SYSTOLIC BLOOD PRESSURE: 120 MMHG | HEART RATE: 65 BPM

## 2024-12-19 LAB — SARS-COV-2 RNA SPEC QL NAA+PROBE: SIGNIFICANT CHANGE UP

## 2024-12-19 RX ORDER — SENNOSIDES 8.6 MG
2 TABLET ORAL
Qty: 0 | Refills: 0 | DISCHARGE
Start: 2024-12-19

## 2024-12-19 RX ORDER — METHOCARBAMOL 500 MG/1
1 TABLET, FILM COATED ORAL
Qty: 0 | Refills: 0 | DISCHARGE
Start: 2024-12-19

## 2024-12-19 RX ORDER — ESCITALOPRAM OXALATE 10 MG/1
1 TABLET, FILM COATED ORAL
Qty: 0 | Refills: 0 | DISCHARGE

## 2024-12-19 RX ORDER — PANTOPRAZOLE SODIUM 40 MG/1
1 TABLET, DELAYED RELEASE ORAL
Qty: 0 | Refills: 0 | DISCHARGE
Start: 2024-12-19

## 2024-12-19 RX ORDER — METOPROLOL TARTRATE 100 MG/1
1 TABLET, FILM COATED ORAL
Qty: 0 | Refills: 0 | DISCHARGE

## 2024-12-19 RX ORDER — ACETAMINOPHEN 500MG 500 MG/1
2 TABLET, COATED ORAL
Qty: 0 | Refills: 0 | DISCHARGE
Start: 2024-12-19

## 2024-12-19 RX ORDER — HYDROCHLOROTHIAZIDE 50 MG
1 TABLET ORAL
Qty: 0 | Refills: 0 | DISCHARGE

## 2024-12-19 RX ORDER — TAMSULOSIN HYDROCHLORIDE 0.4 MG/1
1 CAPSULE ORAL
Qty: 0 | Refills: 0 | DISCHARGE
Start: 2024-12-19

## 2024-12-19 RX ORDER — ENOXAPARIN SODIUM 30 MG/.3ML
40 INJECTION SUBCUTANEOUS
Qty: 0 | Refills: 0 | DISCHARGE
Start: 2024-12-19

## 2024-12-19 RX ORDER — OXYCODONE HYDROCHLORIDE 30 MG/1
1 TABLET ORAL
Qty: 0 | Refills: 0 | DISCHARGE

## 2024-12-19 RX ORDER — POLYETHYLENE GLYCOL 3350 17 G/17G
17 POWDER, FOR SOLUTION ORAL
Qty: 0 | Refills: 0 | DISCHARGE
Start: 2024-12-19

## 2024-12-19 RX ORDER — ROSUVASTATIN CALCIUM 5 MG/1
1 TABLET, FILM COATED ORAL
Qty: 0 | Refills: 0 | DISCHARGE

## 2024-12-19 RX ADMIN — CHLORHEXIDINE GLUCONATE 1 APPLICATION(S): 1.2 RINSE ORAL at 05:50

## 2024-12-19 RX ADMIN — ESCITALOPRAM OXALATE 10 MILLIGRAM(S): 10 TABLET, FILM COATED ORAL at 11:27

## 2024-12-19 RX ADMIN — METOPROLOL TARTRATE 50 MILLIGRAM(S): 100 TABLET, FILM COATED ORAL at 05:52

## 2024-12-19 RX ADMIN — Medication 81 MILLIGRAM(S): at 11:27

## 2024-12-19 RX ADMIN — METHOCARBAMOL 500 MILLIGRAM(S): 500 TABLET, FILM COATED ORAL at 05:49

## 2024-12-19 RX ADMIN — ACETAMINOPHEN 500MG 650 MILLIGRAM(S): 500 TABLET, COATED ORAL at 01:34

## 2024-12-19 RX ADMIN — ACETAMINOPHEN 500MG 650 MILLIGRAM(S): 500 TABLET, COATED ORAL at 11:26

## 2024-12-19 RX ADMIN — PANTOPRAZOLE SODIUM 40 MILLIGRAM(S): 40 TABLET, DELAYED RELEASE ORAL at 05:50

## 2024-12-19 RX ADMIN — ACETAMINOPHEN 500MG 650 MILLIGRAM(S): 500 TABLET, COATED ORAL at 05:49

## 2024-12-19 RX ADMIN — ACETAMINOPHEN 500MG 650 MILLIGRAM(S): 500 TABLET, COATED ORAL at 05:55

## 2024-12-19 RX ADMIN — ACETAMINOPHEN 500MG 650 MILLIGRAM(S): 500 TABLET, COATED ORAL at 11:40

## 2024-12-19 RX ADMIN — ACETAMINOPHEN 500MG 650 MILLIGRAM(S): 500 TABLET, COATED ORAL at 01:04

## 2024-12-19 NOTE — DISCHARGE NOTE PROVIDER - HOSPITAL COURSE
ON 12/12/2024 PATIENT UNDERWENT A T10-T12 posterior spinal fusion, T11-L1 laminectomy and decompression, revision L3-L4 posterior spinal fusion WITH DR LANG AND DR OLIVIA.    PT TOLERATED THE PROCEDURE WELL WITH NO COMPLICATIONS.    PT WAS TRANSFERRED TO RECOVERY UNIT IN STABLE CONDITION THEN TO THE FLOOR.   PT DID WELL POST OP AND WAS SEEN BY PHYSICAL THERAP AND DID WELL.   POST OP PATIENT DEVELOPED URINARY RETENTION AND HAD MULTIPLE FAILED VOIDS.   A GONZALES WAS PLACE AND WILL REMAIN IN PLACE UNTIL MORE AMBULATORY AND SEEN BY OUTPATIENT UROLOGIST.    PT WAS CLEARED FOR DISCHARGE TO A SKILLED NURSING FACILITY.

## 2024-12-19 NOTE — DISCHARGE NOTE PROVIDER - NSDCFUADDINST_GEN_ALL_CORE_FT
KEEP INCISION CLEAN AND DRY, OK TO SHOWER DO NOT SATURATE PAT DRY,   IF INCREASED PAIN FEVER SWELLING OR DISCHARGE CALL MD OFFICE.    PT HAD POST OP URINARY RETENTION AND GONZALES IS PLACED.  WILL NEED TRAIL OF VOID AND OUTPATIENT FOLLOW UP WITH A UROLOGIST.   WAS STARTED ON FLOMAX WHILE INPATIENT.   WBAT WITH A WALKER.   FOLLOW UP WITH SURGEON 2 WEEKS POST OP CALL FOR APPOINTMENT.   NEEDS OUTPATIENT UROLOGIST PLEASE CALL FOR AN APPOINTMENT.   PLEASE DO NOT GIVE ANY NSAIDS.    PLEASE GIVE DVT PROPHYLAXIS FOR 30 DAYS  POST OP.  CONTINUE LOVENOX

## 2024-12-19 NOTE — DISCHARGE NOTE PROVIDER - NSDCMRMEDTOKEN_GEN_ALL_CORE_FT
acetaminophen 325 mg oral tablet: 2 tab(s) orally every 6 hours  aspirin 81 mg oral tablet: orally once a day  enoxaparin: 40 milligram(s) subcutaneously once a day FOR BLOOD CLOT PREVNETION 30 DAYS POST OP  escitalopram 10 mg oral tablet: 1 tab(s) orally once a day  hydroCHLOROthiazide 25 mg oral tablet: 1 tab(s) orally once a day  magnesium oxide 400 mg oral tablet: 1 tab(s) orally once a day  methocarbamol 500 mg oral tablet: 1 tab(s) orally every 8 hours  Metoprolol Succinate ER 50 mg oral tablet, extended release: 1 tab(s) orally once a day  omeprazole 20 mg oral delayed release capsule: 1 cap(s) orally  pantoprazole 40 mg oral delayed release tablet: 1 tab(s) orally once a day (before a meal)  polyethylene glycol 3350 oral powder for reconstitution: 17 gram(s) orally once a day As needed Constipation  rosuvastatin 10 mg oral tablet: 1 tab(s) orally once a day  senna leaf extract oral tablet: 2 tab(s) orally once a day (at bedtime)  tamsulosin 0.4 mg oral capsule: 1 cap(s) orally once a day (at bedtime)   acetaminophen 325 mg oral tablet: 2 tab(s) orally every 6 hours as needed for  mild pain  aspirin 81 mg oral tablet: orally once a day  enoxaparin: 40 milligram(s) subcutaneously once a day FOR BLOOD CLOT PREVNETION 30 DAYS POST OP  escitalopram 10 mg oral tablet: 1 tab(s) orally once a day  hydroCHLOROthiazide 25 mg oral tablet: 1 tab(s) orally once a day  magnesium oxide 400 mg oral tablet: 1 tab(s) orally once a day  methocarbamol 500 mg oral tablet: 1 tab(s) orally every 8 hours  Metoprolol Succinate ER 50 mg oral tablet, extended release: 1 tab(s) orally once a day  omeprazole 20 mg oral delayed release capsule: 1 cap(s) orally  oxyCODONE 5 mg oral tablet: 1 tab(s) orally every 6 hours as needed for  severe pain  polyethylene glycol 3350 oral powder for reconstitution: 17 gram(s) orally once a day As needed Constipation  rosuvastatin 10 mg oral tablet: 1 tab(s) orally once a day  senna leaf extract oral tablet: 2 tab(s) orally once a day (at bedtime)  tamsulosin 0.4 mg oral capsule: 1 cap(s) orally once a day (at bedtime)

## 2024-12-19 NOTE — DISCHARGE NOTE NURSING/CASE MANAGEMENT/SOCIAL WORK - FINANCIAL ASSISTANCE
[facility] provides services at a reduced cost to those who are determined to be eligible through [facility]’s financial assistance program. Information regarding [facility]’s financial assistance program can be found by going to [email or web address] or by calling [phone].

## 2024-12-19 NOTE — DISCHARGE NOTE PROVIDER - NSDCFUSCHEDAPPT_GEN_ALL_CORE_FT
[facility]  NEUROSURG [address]  Scheduled Appointment: 01/06/2025    Michael Mayes  [facility] PreAdmits  Scheduled Appointment: 02/13/2025    Michael Mayes  [facility]  [facility]  [address]  Scheduled Appointment: 02/13/2025

## 2024-12-19 NOTE — DISCHARGE NOTE NURSING/CASE MANAGEMENT/SOCIAL WORK - NSDCPEFALRISK_GEN_ALL_CORE
For information on Fall & Injury Prevention, visit: [email or web address] OR  [email or web address] OR  https://www.cdc.gov/steadi/patient.html

## 2024-12-19 NOTE — PROGRESS NOTE ADULT - ASSESSMENT
pt seen at bedside. Resting comfortably.   doing well no events overnight    PE:  resting comfortably  NAD    Dressing C/D/I  Compartments soft and compressible  Motor intact distally  SILT distally  CR<2sec, palpable pulses        A/P: 79yMale S/P T10-T12 PSF, T11-L1 LAMI, REVISION L3-L4 PSF 12/12. Doing well.     - plan to d/c with esqueda + flomax  w/ urology follow up  -PT HAD BM   - drains removed 12/16  - OOB to Chair   - WBAT  - PT/OT  - Pain control   - Extremity icing/elevation  - Incentive Spirometry   - DVT Prophylaxis   - Discharge planning to snf today       Discussed with urology team- Ok for patient to be d/c with esqueda; esqueda can remain for up to 1 month. Patient should have urology follow up within the next two weeks.   Discussed with patient and patients wife bedside this information; the patient is going to facility in [place] so would like to follow up with a urologist closer to their home. Offered outpatient follow up with urologist from the hospital; patient declined.

## 2024-12-19 NOTE — DISCHARGE NOTE PROVIDER - NSDCCPCAREPLAN_GEN_ALL_CORE_FT
PRINCIPAL DISCHARGE DIAGNOSIS  Diagnosis: S/P fusion of thoracic spine  Assessment and Plan of Treatment: KEEP INCISION CLEAN AND DRY, OK TO SHOWER DO NOT SATURATE PAT DRY,   IF INCREASED PAIN FEVER SWELLING OR DISCHARGE CALL MD OFFICE.    PT HAD POST OP URINARY RETENTION AND GONZALES IS PLACED.  WILL NEED TTRAIL OF VOID AND OUTPATIENT FOLLOW UP WITH A UROLOGIST.   WAS STARTED ON FLOMAX WHILE INPATIENT.   WBAT WITH A WALKER.   FOLLOW UP WITH SURGEON 2 WEEKS POST OP CALL FOR APPOINTMENT.   NEEDS OUTPATIENT UROLOGIST PLEASE CALL FOR AN APPOINTMENT.   PLEASE DO NOT GIVE ANY NSAIDS.    PLEASE GIVE DVT PROPHYLAXIS FOR 30 DAYS  POST OP.  CONTINUE LOVENOX

## 2024-12-19 NOTE — DISCHARGE NOTE PROVIDER - CARE PROVIDER_API CALL
Lc Jones  Orthopaedic Surgery  [address] [place] [address]  Phone: [phone]  Fax: [phone]  Follow Up Time: 2 weeks

## 2024-12-19 NOTE — DISCHARGE NOTE NURSING/CASE MANAGEMENT/SOCIAL WORK - PATIENT PORTAL LINK FT
You can access the FollowMyHealth Patient Portal offered by [facility] by registering at the following website: [email or web address]. By joining [employer]’s FollowMyHealth portal, you will also be able to view your health information using other applications (apps) compatible with our system.

## 2024-12-27 DIAGNOSIS — Z11.52 ENCOUNTER FOR SCREENING FOR COVID-19: ICD-10-CM

## 2024-12-27 DIAGNOSIS — K59.03 DRUG INDUCED CONSTIPATION: ICD-10-CM

## 2024-12-27 DIAGNOSIS — R33.9 RETENTION OF URINE, UNSPECIFIED: ICD-10-CM

## 2024-12-27 DIAGNOSIS — M21.371 FOOT DROP, RIGHT FOOT: ICD-10-CM

## 2024-12-27 DIAGNOSIS — G99.2 MYELOPATHY IN DISEASES CLASSIFIED ELSEWHERE: ICD-10-CM

## 2024-12-27 DIAGNOSIS — G95.89 OTHER SPECIFIED DISEASES OF SPINAL CORD: ICD-10-CM

## 2024-12-27 DIAGNOSIS — M48.05 SPINAL STENOSIS, THORACOLUMBAR REGION: ICD-10-CM

## 2024-12-27 DIAGNOSIS — M48.04 SPINAL STENOSIS, THORACIC REGION: ICD-10-CM

## 2024-12-27 DIAGNOSIS — M96.0 PSEUDARTHROSIS AFTER FUSION OR ARTHRODESIS: ICD-10-CM

## 2024-12-27 DIAGNOSIS — T40.2X5A ADVERSE EFFECT OF OTHER OPIOIDS, INITIAL ENCOUNTER: ICD-10-CM

## 2024-12-27 DIAGNOSIS — T84.216A BREAKDOWN (MECHANICAL) OF INTERNAL FIXATION DEVICE OF VERTEBRAE, INITIAL ENCOUNTER: ICD-10-CM

## 2025-01-06 ENCOUNTER — NON-APPOINTMENT (OUTPATIENT)
Age: 80
End: 2025-01-06

## 2025-01-06 ENCOUNTER — APPOINTMENT (OUTPATIENT)
Dept: NEUROSURGERY | Facility: CLINIC | Age: 80
End: 2025-01-06
Payer: MEDICARE

## 2025-01-06 VITALS — HEIGHT: 70 IN | WEIGHT: 170 LBS | BODY MASS INDEX: 24.34 KG/M2

## 2025-01-06 DIAGNOSIS — M48.04 SPINAL STENOSIS, THORACIC REGION: ICD-10-CM

## 2025-01-06 DIAGNOSIS — M48.061 SPINAL STENOSIS, LUMBAR REGION WITHOUT NEUROGENIC CLAUDICATION: ICD-10-CM

## 2025-01-06 PROCEDURE — 99024 POSTOP FOLLOW-UP VISIT: CPT

## 2025-03-05 ENCOUNTER — APPOINTMENT (OUTPATIENT)
Dept: NEUROSURGERY | Facility: CLINIC | Age: 80
End: 2025-03-05
Payer: MEDICARE

## 2025-03-05 VITALS — HEIGHT: 70 IN | WEIGHT: 170 LBS | BODY MASS INDEX: 24.34 KG/M2

## 2025-03-05 DIAGNOSIS — M48.04 SPINAL STENOSIS, THORACIC REGION: ICD-10-CM

## 2025-03-05 DIAGNOSIS — M48.061 SPINAL STENOSIS, LUMBAR REGION WITHOUT NEUROGENIC CLAUDICATION: ICD-10-CM

## 2025-03-05 PROCEDURE — 99024 POSTOP FOLLOW-UP VISIT: CPT

## 2025-03-17 ENCOUNTER — OUTPATIENT (OUTPATIENT)
Dept: OUTPATIENT SERVICES | Facility: HOSPITAL | Age: 80
LOS: 1 days | End: 2025-03-17
Payer: MEDICARE

## 2025-03-17 ENCOUNTER — LABORATORY RESULT (OUTPATIENT)
Age: 80
End: 2025-03-17

## 2025-03-17 ENCOUNTER — APPOINTMENT (OUTPATIENT)
Age: 80
End: 2025-03-17
Payer: MEDICARE

## 2025-03-17 VITALS
TEMPERATURE: 98.1 F | OXYGEN SATURATION: 97 % | SYSTOLIC BLOOD PRESSURE: 137 MMHG | DIASTOLIC BLOOD PRESSURE: 76 MMHG | HEART RATE: 58 BPM | WEIGHT: 171.44 LBS | BODY MASS INDEX: 24.54 KG/M2 | HEIGHT: 70 IN

## 2025-03-17 DIAGNOSIS — Z98.890 OTHER SPECIFIED POSTPROCEDURAL STATES: Chronic | ICD-10-CM

## 2025-03-17 DIAGNOSIS — Z90.49 ACQUIRED ABSENCE OF OTHER SPECIFIED PARTS OF DIGESTIVE TRACT: Chronic | ICD-10-CM

## 2025-03-17 DIAGNOSIS — Z85.72 PERSONAL HISTORY OF NON-HODGKIN LYMPHOMAS: ICD-10-CM

## 2025-03-17 DIAGNOSIS — C82.9A: ICD-10-CM

## 2025-03-17 LAB
AUTO BASOPHILS #: 0.03 K/UL
AUTO BASOPHILS %: 0.4 %
AUTO EOSINOPHILS #: 0.24 K/UL
AUTO EOSINOPHILS %: 3.3 %
AUTO IMMATURE GRANULOCYTES #: 0.03 K/UL
AUTO LYMPHOCYTES #: 2.27 K/UL
AUTO LYMPHOCYTES %: 31.4 %
AUTO MONOCYTES #: 0.79 K/UL
AUTO MONOCYTES %: 10.9 %
AUTO NEUTROPHILS #: 3.86 K/UL
AUTO NEUTROPHILS %: 53.6 %
AUTO NRBC #: 0 K/UL
HCT VFR BLD CALC: 34.8 %
HGB BLD-MCNC: 11.6 G/DL
IMM GRANULOCYTES NFR BLD AUTO: 0.4 %
IMMATURE RETICULOCYTE FRACTION %: 4.8 %
MAN DIFF?: NORMAL
MCHC RBC-ENTMCNC: 30.4 PG
MCHC RBC-ENTMCNC: 33.3 G/DL
MCV RBC AUTO: 91.3 FL
PLATELET # BLD AUTO: 155 K/UL
PMV BLD AUTO: 0 /100 WBCS
PMV BLD: 9.5 FL
RBC # BLD: 3.81 M/UL
RBC # BLD: 3.82 M/UL
RBC # FLD: 13.9 %
RETICS # AUTO: 0.8 %
RETICS AGGREG/RBC NFR: 0 K/UL
RETICULOCYTE HEMOGLOBIN EQUIVALENT: 31.8 PG
WBC # FLD AUTO: 7.22 K/UL

## 2025-03-17 PROCEDURE — 99214 OFFICE O/P EST MOD 30 MIN: CPT

## 2025-03-17 PROCEDURE — 83615 LACTATE (LD) (LDH) ENZYME: CPT

## 2025-03-17 PROCEDURE — 85045 AUTOMATED RETICULOCYTE COUNT: CPT

## 2025-03-17 PROCEDURE — 85025 COMPLETE CBC W/AUTO DIFF WBC: CPT

## 2025-03-17 PROCEDURE — 82232 ASSAY OF BETA-2 PROTEIN: CPT

## 2025-03-17 PROCEDURE — 80053 COMPREHEN METABOLIC PANEL: CPT

## 2025-03-18 DIAGNOSIS — Z85.72 PERSONAL HISTORY OF NON-HODGKIN LYMPHOMAS: ICD-10-CM

## 2025-03-18 LAB
ALBUMIN SERPL ELPH-MCNC: 4.2 G/DL
ALP BLD-CCNC: 95 U/L
ALT SERPL-CCNC: 6 U/L
ANION GAP SERPL CALC-SCNC: 10 MMOL/L
AST SERPL-CCNC: 11 U/L
B2 MICROGLOB SERPL-MCNC: 4.4 MG/L
BILIRUB SERPL-MCNC: 0.5 MG/DL
BUN SERPL-MCNC: 25 MG/DL
CALCIUM SERPL-MCNC: 8.9 MG/DL
CHLORIDE SERPL-SCNC: 104 MMOL/L
CO2 SERPL-SCNC: 27 MMOL/L
CREAT SERPL-MCNC: 1.3 MG/DL
EGFRCR SERPLBLD CKD-EPI 2021: 55 ML/MIN/1.73M2
GLUCOSE SERPL-MCNC: 99 MG/DL
LDH SERPL-CCNC: 173 U/L
POTASSIUM SERPL-SCNC: 5.3 MMOL/L
PROT SERPL-MCNC: 6.7 G/DL
SODIUM SERPL-SCNC: 141 MMOL/L

## 2025-06-16 ENCOUNTER — APPOINTMENT (OUTPATIENT)
Dept: NEUROSURGERY | Facility: CLINIC | Age: 80
End: 2025-06-16

## 2025-08-04 ENCOUNTER — APPOINTMENT (OUTPATIENT)
Dept: NEUROSURGERY | Facility: CLINIC | Age: 80
End: 2025-08-04
Payer: MEDICARE

## 2025-08-04 DIAGNOSIS — G95.9 DISEASE OF SPINAL CORD, UNSPECIFIED: ICD-10-CM

## 2025-08-04 DIAGNOSIS — M48.061 SPINAL STENOSIS, LUMBAR REGION WITHOUT NEUROGENIC CLAUDICATION: ICD-10-CM

## 2025-08-04 DIAGNOSIS — C82.9A: ICD-10-CM

## 2025-08-04 DIAGNOSIS — M48.04 SPINAL STENOSIS, THORACIC REGION: ICD-10-CM

## 2025-08-04 PROCEDURE — 99213 OFFICE O/P EST LOW 20 MIN: CPT

## 2025-08-05 PROBLEM — C82.9A: Status: RESOLVED | Noted: 2025-03-17 | Resolved: 2025-08-05
